# Patient Record
Sex: FEMALE | Race: ASIAN | NOT HISPANIC OR LATINO | URBAN - METROPOLITAN AREA
[De-identification: names, ages, dates, MRNs, and addresses within clinical notes are randomized per-mention and may not be internally consistent; named-entity substitution may affect disease eponyms.]

---

## 2021-02-15 ENCOUNTER — INPATIENT (INPATIENT)
Facility: HOSPITAL | Age: 31
LOS: 4 days | Discharge: HOME | End: 2021-02-20
Attending: OBSTETRICS & GYNECOLOGY | Admitting: OBSTETRICS & GYNECOLOGY
Payer: COMMERCIAL

## 2021-02-15 ENCOUNTER — EMERGENCY (EMERGENCY)
Facility: HOSPITAL | Age: 31
LOS: 1 days | Discharge: STILL ADMITTED OR ADMINISTRATIVE | End: 2021-02-15
Admitting: EMERGENCY MEDICINE
Payer: SELF-PAY

## 2021-02-15 VITALS
DIASTOLIC BLOOD PRESSURE: 96 MMHG | TEMPERATURE: 98 F | RESPIRATION RATE: 16 BRPM | SYSTOLIC BLOOD PRESSURE: 152 MMHG | HEART RATE: 100 BPM | OXYGEN SATURATION: 100 %

## 2021-02-15 VITALS
HEART RATE: 107 BPM | RESPIRATION RATE: 17 BRPM | DIASTOLIC BLOOD PRESSURE: 107 MMHG | TEMPERATURE: 98 F | SYSTOLIC BLOOD PRESSURE: 152 MMHG

## 2021-02-15 DIAGNOSIS — O26.899 OTHER SPECIFIED PREGNANCY RELATED CONDITIONS, UNSPECIFIED TRIMESTER: ICD-10-CM

## 2021-02-15 DIAGNOSIS — Z3A.00 WEEKS OF GESTATION OF PREGNANCY NOT SPECIFIED: ICD-10-CM

## 2021-02-15 LAB
ALBUMIN SERPL ELPH-MCNC: 3.8 G/DL — SIGNIFICANT CHANGE UP (ref 3.3–5)
ALP SERPL-CCNC: 230 U/L — HIGH (ref 40–120)
ALT FLD-CCNC: 18 U/L — SIGNIFICANT CHANGE UP (ref 4–33)
ANION GAP SERPL CALC-SCNC: 16 MMOL/L — HIGH (ref 7–14)
APPEARANCE UR: ABNORMAL
APTT BLD: 30.7 SEC — SIGNIFICANT CHANGE UP (ref 27–36.3)
AST SERPL-CCNC: 31 U/L — SIGNIFICANT CHANGE UP (ref 4–32)
BACTERIA # UR AUTO: ABNORMAL
BASOPHILS # BLD AUTO: 0.04 K/UL — SIGNIFICANT CHANGE UP (ref 0–0.2)
BASOPHILS NFR BLD AUTO: 0.5 % — SIGNIFICANT CHANGE UP (ref 0–2)
BILIRUB SERPL-MCNC: 0.4 MG/DL — SIGNIFICANT CHANGE UP (ref 0.2–1.2)
BILIRUB UR-MCNC: NEGATIVE — SIGNIFICANT CHANGE UP
BLD GP AB SCN SERPL QL: NEGATIVE — SIGNIFICANT CHANGE UP
BUN SERPL-MCNC: 8 MG/DL — SIGNIFICANT CHANGE UP (ref 7–23)
CALCIUM SERPL-MCNC: 10 MG/DL — SIGNIFICANT CHANGE UP (ref 8.4–10.5)
CHLORIDE SERPL-SCNC: 101 MMOL/L — SIGNIFICANT CHANGE UP (ref 98–107)
CO2 SERPL-SCNC: 20 MMOL/L — LOW (ref 22–31)
COLOR SPEC: YELLOW — SIGNIFICANT CHANGE UP
CREAT ?TM UR-MCNC: 82 MG/DL — SIGNIFICANT CHANGE UP
CREAT SERPL-MCNC: 0.6 MG/DL — SIGNIFICANT CHANGE UP (ref 0.5–1.3)
DIFF PNL FLD: ABNORMAL
EOSINOPHIL # BLD AUTO: 0.05 K/UL — SIGNIFICANT CHANGE UP (ref 0–0.5)
EOSINOPHIL NFR BLD AUTO: 0.6 % — SIGNIFICANT CHANGE UP (ref 0–6)
EPI CELLS # UR: 2 /HPF — SIGNIFICANT CHANGE UP (ref 0–5)
FIBRINOGEN PPP-MCNC: 551 MG/DL — HIGH (ref 290–520)
GLUCOSE SERPL-MCNC: 108 MG/DL — HIGH (ref 70–99)
GLUCOSE UR QL: NEGATIVE — SIGNIFICANT CHANGE UP
HCT VFR BLD CALC: 42 % — SIGNIFICANT CHANGE UP (ref 34.5–45)
HGB BLD-MCNC: 13.9 G/DL — SIGNIFICANT CHANGE UP (ref 11.5–15.5)
HYALINE CASTS # UR AUTO: 3 /LPF — SIGNIFICANT CHANGE UP (ref 0–7)
IANC: 6.48 K/UL — SIGNIFICANT CHANGE UP (ref 1.5–8.5)
IMM GRANULOCYTES NFR BLD AUTO: 0.9 % — SIGNIFICANT CHANGE UP (ref 0–1.5)
INR BLD: 0.96 RATIO — SIGNIFICANT CHANGE UP (ref 0.88–1.16)
KETONES UR-MCNC: ABNORMAL
LDH SERPL L TO P-CCNC: 213 U/L — SIGNIFICANT CHANGE UP (ref 135–225)
LEUKOCYTE ESTERASE UR-ACNC: ABNORMAL
LYMPHOCYTES # BLD AUTO: 1.25 K/UL — SIGNIFICANT CHANGE UP (ref 1–3.3)
LYMPHOCYTES # BLD AUTO: 14.7 % — SIGNIFICANT CHANGE UP (ref 13–44)
MCHC RBC-ENTMCNC: 30.5 PG — SIGNIFICANT CHANGE UP (ref 27–34)
MCHC RBC-ENTMCNC: 33.1 GM/DL — SIGNIFICANT CHANGE UP (ref 32–36)
MCV RBC AUTO: 92.3 FL — SIGNIFICANT CHANGE UP (ref 80–100)
MONOCYTES # BLD AUTO: 0.58 K/UL — SIGNIFICANT CHANGE UP (ref 0–0.9)
MONOCYTES NFR BLD AUTO: 6.8 % — SIGNIFICANT CHANGE UP (ref 2–14)
NEUTROPHILS # BLD AUTO: 6.48 K/UL — SIGNIFICANT CHANGE UP (ref 1.8–7.4)
NEUTROPHILS NFR BLD AUTO: 76.5 % — SIGNIFICANT CHANGE UP (ref 43–77)
NITRITE UR-MCNC: NEGATIVE — SIGNIFICANT CHANGE UP
NRBC # BLD: 0 /100 WBCS — SIGNIFICANT CHANGE UP
NRBC # FLD: 0.02 K/UL — HIGH
PH UR: 6 — SIGNIFICANT CHANGE UP (ref 5–8)
PLATELET # BLD AUTO: 138 K/UL — LOW (ref 150–400)
POTASSIUM SERPL-MCNC: 3.4 MMOL/L — LOW (ref 3.5–5.3)
POTASSIUM SERPL-SCNC: 3.4 MMOL/L — LOW (ref 3.5–5.3)
PROT ?TM UR-MCNC: 32 MG/DL — SIGNIFICANT CHANGE UP
PROT ?TM UR-MCNC: 32 MG/DL — SIGNIFICANT CHANGE UP
PROT SERPL-MCNC: 6.8 G/DL — SIGNIFICANT CHANGE UP (ref 6–8.3)
PROT UR-MCNC: ABNORMAL
PROT/CREAT UR-RTO: 0.4 RATIO — HIGH (ref 0–0.2)
PROTHROM AB SERPL-ACNC: 11 SEC — SIGNIFICANT CHANGE UP (ref 10.6–13.6)
RBC # BLD: 4.55 M/UL — SIGNIFICANT CHANGE UP (ref 3.8–5.2)
RBC # FLD: 14.1 % — SIGNIFICANT CHANGE UP (ref 10.3–14.5)
RBC CASTS # UR COMP ASSIST: 2 /HPF — SIGNIFICANT CHANGE UP (ref 0–4)
RH IG SCN BLD-IMP: POSITIVE — SIGNIFICANT CHANGE UP
SODIUM SERPL-SCNC: 137 MMOL/L — SIGNIFICANT CHANGE UP (ref 135–145)
SP GR SPEC: 1.01 — SIGNIFICANT CHANGE UP (ref 1.01–1.02)
URATE SERPL-MCNC: 6.1 MG/DL — SIGNIFICANT CHANGE UP (ref 2.5–7)
UROBILINOGEN FLD QL: SIGNIFICANT CHANGE UP
WBC # BLD: 8.48 K/UL — SIGNIFICANT CHANGE UP (ref 3.8–10.5)
WBC # FLD AUTO: 8.48 K/UL — SIGNIFICANT CHANGE UP (ref 3.8–10.5)
WBC UR QL: 31 /HPF — HIGH (ref 0–5)

## 2021-02-15 PROCEDURE — L9993: CPT

## 2021-02-15 RX ORDER — SODIUM CHLORIDE 9 MG/ML
1000 INJECTION, SOLUTION INTRAVENOUS
Refills: 0 | Status: DISCONTINUED | OUTPATIENT
Start: 2021-02-15 | End: 2021-02-17

## 2021-02-15 RX ORDER — OXYTOCIN 10 UNIT/ML
333.33 VIAL (ML) INJECTION
Qty: 20 | Refills: 0 | Status: DISCONTINUED | OUTPATIENT
Start: 2021-02-15 | End: 2021-02-17

## 2021-02-15 RX ORDER — MAGNESIUM SULFATE 500 MG/ML
4 VIAL (ML) INJECTION ONCE
Refills: 0 | Status: COMPLETED | OUTPATIENT
Start: 2021-02-15 | End: 2021-02-15

## 2021-02-15 RX ORDER — CITRIC ACID/SODIUM CITRATE 300-500 MG
15 SOLUTION, ORAL ORAL EVERY 6 HOURS
Refills: 0 | Status: DISCONTINUED | OUTPATIENT
Start: 2021-02-15 | End: 2021-02-17

## 2021-02-15 RX ORDER — ACETAMINOPHEN 500 MG
975 TABLET ORAL ONCE
Refills: 0 | Status: COMPLETED | OUTPATIENT
Start: 2021-02-15 | End: 2021-02-15

## 2021-02-15 RX ORDER — MAGNESIUM SULFATE 500 MG/ML
2 VIAL (ML) INJECTION
Qty: 40 | Refills: 0 | Status: COMPLETED | OUTPATIENT
Start: 2021-02-15 | End: 2021-02-16

## 2021-02-15 RX ADMIN — Medication 975 MILLIGRAM(S): at 20:50

## 2021-02-15 RX ADMIN — Medication 200 GRAM(S): at 21:36

## 2021-02-15 RX ADMIN — Medication 50 GM/HR: at 22:12

## 2021-02-15 RX ADMIN — SODIUM CHLORIDE 50 MILLILITER(S): 9 INJECTION, SOLUTION INTRAVENOUS at 21:37

## 2021-02-15 NOTE — OB PROVIDER H&P - ATTENDING COMMENTS
Attending Note   Admit for pre-eclampsia with severe features given headache and mild range bps   will plan for po cytotec and cervical balloon

## 2021-02-15 NOTE — ED ADULT TRIAGE NOTE - CHIEF COMPLAINT QUOTE
Pt sent for eval of high BP, pt states that she has been following with OB for the last week, pt c/o slight headache, pt  38 weeks pregnant EDC 21

## 2021-02-15 NOTE — OB PROVIDER H&P - ASSESSMENT
Assessment  – 32yo  at 38w5d with persistent mild range pressures and a HA x1 day, meets criteria for sPEC.    Plan  Admit to LND. Routine Labs. IVF.  IOL w/ PO cytotec, cervical balloon  Fetus: cat 1 tracing. Continuous EFM.   sPEC - Mg bolus followed by maintenance, continue to monitor pressures. Mg q6hrs. HELLP labs pending   GBS neg  COVID pending     Patient discussed with attending physician, Dr. Stern.      Minal Ponce MD PGY1

## 2021-02-15 NOTE — OB PROVIDER H&P - HISTORY OF PRESENT ILLNESS
R1 Admission H&P    Subjective  HPI: 31yoF  at 38w4d gestational age sent in from office for elevated blood pressure. Per patient, she was told her blood pressure was elevated in the office one week ago, >140s/80s. Patient was seen in the office today where BP was again elevated, 148/96. She was sent in from the office and initially presented to the ED where her BP was 152/96. Patient endorsing headache since last night, states it feels like a "pressure" in her head and behind her eyes that has gotten gradually worse. Denies blurry vision, RUQ pain. +FM. -LOF. -CTXs. Endorsing spotting after having membranes stripped in office today. Pt denies any other concerns.    – PNC: Gestational hypertension, first noted at 37w GA. Recurrent UTI, most recently 1 month ago treated with Keflex. GBS neg.  EFW 3500g by giovani.  – OBHx: G1  – GynHx: denies  – PMH: denies  – PSH: denies  – Psych: denies   – Social: denies   – Meds: PNV   – Allergies: NKDA  – Will accept blood transfusions? Yes

## 2021-02-15 NOTE — OB PROVIDER H&P - NSHPPHYSICALEXAM_GEN_ALL_CORE
Objective  Vital signs  VS  T(C): 36.9 (02-15-21 @ 21:40)  HR: 111 (02-15-21 @ 22:10)  BP: 129/78 (02-15-21 @ 22:10)  RR: 17 (02-15-21 @ 21:40)  SpO2: 100% (02-15-21 @ 19:45)    – PE:   CV: RRR  Pulm: breathing comfortably on RA  Abd: gravid, nontender  Extr: moving all extremities with ease  – VE: 1/50/-3  – FHT: baseline 125, mod variability, -accels, -decels  – [place]: irregular  – EFW: 3500g by sono  – Sono: vertex

## 2021-02-15 NOTE — OB PROVIDER LABOR PROGRESS NOTE - NS_SUBJECTIVE/OBJECTIVE_OBGYN_ALL_OB_FT
R1 Progress Note     Patient examined for placement of cervical balloon. CB placed 60 cc NS in uterine, 60 in vaginal. Patient tolerated procedure well.

## 2021-02-15 NOTE — OB RN TRIAGE NOTE - CHIEF COMPLAINT QUOTE
My BP was high @ the 's office 148/96.  I had a h/a last night relieved with tylenol & again today which went away on its own.  I also have pressure in my eyes.

## 2021-02-16 DIAGNOSIS — O16.9 UNSPECIFIED MATERNAL HYPERTENSION, UNSPECIFIED TRIMESTER: ICD-10-CM

## 2021-02-16 LAB
ALBUMIN SERPL ELPH-MCNC: 3.4 G/DL — SIGNIFICANT CHANGE UP (ref 3.3–5)
ALBUMIN SERPL ELPH-MCNC: 3.4 G/DL — SIGNIFICANT CHANGE UP (ref 3.3–5)
ALBUMIN SERPL ELPH-MCNC: 3.6 G/DL — SIGNIFICANT CHANGE UP (ref 3.3–5)
ALBUMIN SERPL ELPH-MCNC: 3.6 G/DL — SIGNIFICANT CHANGE UP (ref 3.3–5)
ALP SERPL-CCNC: 208 U/L — HIGH (ref 40–120)
ALP SERPL-CCNC: 222 U/L — HIGH (ref 40–120)
ALP SERPL-CCNC: 225 U/L — HIGH (ref 40–120)
ALP SERPL-CCNC: 235 U/L — HIGH (ref 40–120)
ALT FLD-CCNC: 11 U/L — SIGNIFICANT CHANGE UP (ref 4–33)
ALT FLD-CCNC: 14 U/L — SIGNIFICANT CHANGE UP (ref 4–33)
ALT FLD-CCNC: 14 U/L — SIGNIFICANT CHANGE UP (ref 4–33)
ALT FLD-CCNC: 15 U/L — SIGNIFICANT CHANGE UP (ref 4–33)
ANION GAP SERPL CALC-SCNC: 11 MMOL/L — SIGNIFICANT CHANGE UP (ref 7–14)
ANION GAP SERPL CALC-SCNC: 12 MMOL/L — SIGNIFICANT CHANGE UP (ref 7–14)
ANION GAP SERPL CALC-SCNC: 14 MMOL/L — SIGNIFICANT CHANGE UP (ref 7–14)
ANION GAP SERPL CALC-SCNC: 15 MMOL/L — HIGH (ref 7–14)
APTT BLD: 27.7 SEC — SIGNIFICANT CHANGE UP (ref 27–36.3)
APTT BLD: 28 SEC — SIGNIFICANT CHANGE UP (ref 27–36.3)
APTT BLD: 28.3 SEC — SIGNIFICANT CHANGE UP (ref 27–36.3)
APTT BLD: 28.7 SEC — SIGNIFICANT CHANGE UP (ref 27–36.3)
AST SERPL-CCNC: 24 U/L — SIGNIFICANT CHANGE UP (ref 4–32)
AST SERPL-CCNC: 24 U/L — SIGNIFICANT CHANGE UP (ref 4–32)
AST SERPL-CCNC: 25 U/L — SIGNIFICANT CHANGE UP (ref 4–32)
AST SERPL-CCNC: 25 U/L — SIGNIFICANT CHANGE UP (ref 4–32)
BASE EXCESS BLDA CALC-SCNC: -4 MMOL/L — LOW (ref -2–2)
BASOPHILS # BLD AUTO: 0 K/UL — SIGNIFICANT CHANGE UP (ref 0–0.2)
BASOPHILS # BLD AUTO: 0.04 K/UL — SIGNIFICANT CHANGE UP (ref 0–0.2)
BASOPHILS NFR BLD AUTO: 0 % — SIGNIFICANT CHANGE UP (ref 0–2)
BASOPHILS NFR BLD AUTO: 0.4 % — SIGNIFICANT CHANGE UP (ref 0–2)
BASOPHILS NFR BLD AUTO: 0.5 % — SIGNIFICANT CHANGE UP (ref 0–2)
BASOPHILS NFR BLD AUTO: 0.5 % — SIGNIFICANT CHANGE UP (ref 0–2)
BILIRUB SERPL-MCNC: 0.3 MG/DL — SIGNIFICANT CHANGE UP (ref 0.2–1.2)
BILIRUB SERPL-MCNC: 0.4 MG/DL — SIGNIFICANT CHANGE UP (ref 0.2–1.2)
BILIRUB SERPL-MCNC: 0.4 MG/DL — SIGNIFICANT CHANGE UP (ref 0.2–1.2)
BILIRUB SERPL-MCNC: 0.5 MG/DL — SIGNIFICANT CHANGE UP (ref 0.2–1.2)
BUN SERPL-MCNC: 5 MG/DL — LOW (ref 7–23)
BUN SERPL-MCNC: 5 MG/DL — LOW (ref 7–23)
BUN SERPL-MCNC: 6 MG/DL — LOW (ref 7–23)
BUN SERPL-MCNC: 8 MG/DL — SIGNIFICANT CHANGE UP (ref 7–23)
CALCIUM SERPL-MCNC: 7.6 MG/DL — LOW (ref 8.4–10.5)
CALCIUM SERPL-MCNC: 7.8 MG/DL — LOW (ref 8.4–10.5)
CALCIUM SERPL-MCNC: 8 MG/DL — LOW (ref 8.4–10.5)
CALCIUM SERPL-MCNC: 9.2 MG/DL — SIGNIFICANT CHANGE UP (ref 8.4–10.5)
CHLORIDE SERPL-SCNC: 100 MMOL/L — SIGNIFICANT CHANGE UP (ref 98–107)
CHLORIDE SERPL-SCNC: 101 MMOL/L — SIGNIFICANT CHANGE UP (ref 98–107)
CHLORIDE SERPL-SCNC: 98 MMOL/L — SIGNIFICANT CHANGE UP (ref 98–107)
CHLORIDE SERPL-SCNC: 99 MMOL/L — SIGNIFICANT CHANGE UP (ref 98–107)
CO2 SERPL-SCNC: 17 MMOL/L — LOW (ref 22–31)
CO2 SERPL-SCNC: 18 MMOL/L — LOW (ref 22–31)
CO2 SERPL-SCNC: 21 MMOL/L — LOW (ref 22–31)
CO2 SERPL-SCNC: 21 MMOL/L — LOW (ref 22–31)
CREAT SERPL-MCNC: 0.53 MG/DL — SIGNIFICANT CHANGE UP (ref 0.5–1.3)
CREAT SERPL-MCNC: 0.56 MG/DL — SIGNIFICANT CHANGE UP (ref 0.5–1.3)
CREAT SERPL-MCNC: 0.58 MG/DL — SIGNIFICANT CHANGE UP (ref 0.5–1.3)
CREAT SERPL-MCNC: 0.74 MG/DL — SIGNIFICANT CHANGE UP (ref 0.5–1.3)
EOSINOPHIL # BLD AUTO: 0 K/UL — SIGNIFICANT CHANGE UP (ref 0–0.5)
EOSINOPHIL # BLD AUTO: 0.04 K/UL — SIGNIFICANT CHANGE UP (ref 0–0.5)
EOSINOPHIL # BLD AUTO: 0.05 K/UL — SIGNIFICANT CHANGE UP (ref 0–0.5)
EOSINOPHIL # BLD AUTO: 0.05 K/UL — SIGNIFICANT CHANGE UP (ref 0–0.5)
EOSINOPHIL NFR BLD AUTO: 0 % — SIGNIFICANT CHANGE UP (ref 0–6)
EOSINOPHIL NFR BLD AUTO: 0.5 % — SIGNIFICANT CHANGE UP (ref 0–6)
EOSINOPHIL NFR BLD AUTO: 0.5 % — SIGNIFICANT CHANGE UP (ref 0–6)
EOSINOPHIL NFR BLD AUTO: 0.6 % — SIGNIFICANT CHANGE UP (ref 0–6)
FIBRINOGEN PPP-MCNC: 523 MG/DL — HIGH (ref 290–520)
FIBRINOGEN PPP-MCNC: 533 MG/DL — HIGH (ref 290–520)
FIBRINOGEN PPP-MCNC: 575 MG/DL — HIGH (ref 290–520)
FIBRINOGEN PPP-MCNC: 598 MG/DL — HIGH (ref 290–520)
GLUCOSE SERPL-MCNC: 103 MG/DL — HIGH (ref 70–99)
GLUCOSE SERPL-MCNC: 87 MG/DL — SIGNIFICANT CHANGE UP (ref 70–99)
GLUCOSE SERPL-MCNC: 89 MG/DL — SIGNIFICANT CHANGE UP (ref 70–99)
GLUCOSE SERPL-MCNC: 90 MG/DL — SIGNIFICANT CHANGE UP (ref 70–99)
HBV SURFACE AG SERPL QL IA: SIGNIFICANT CHANGE UP
HCO3 BLDA-SCNC: 22 MMOL/L — SIGNIFICANT CHANGE UP (ref 22–26)
HCT VFR BLD CALC: 37.6 % — SIGNIFICANT CHANGE UP (ref 34.5–45)
HCT VFR BLD CALC: 38.8 % — SIGNIFICANT CHANGE UP (ref 34.5–45)
HCT VFR BLD CALC: 39.8 % — SIGNIFICANT CHANGE UP (ref 34.5–45)
HCT VFR BLD CALC: 40.4 % — SIGNIFICANT CHANGE UP (ref 34.5–45)
HGB BLD-MCNC: 12.7 G/DL — SIGNIFICANT CHANGE UP (ref 11.5–15.5)
HGB BLD-MCNC: 12.9 G/DL — SIGNIFICANT CHANGE UP (ref 11.5–15.5)
HGB BLD-MCNC: 13 G/DL — SIGNIFICANT CHANGE UP (ref 11.5–15.5)
HGB BLD-MCNC: 13.4 G/DL — SIGNIFICANT CHANGE UP (ref 11.5–15.5)
HIV 1+2 AB+HIV1 P24 AG SERPL QL IA: SIGNIFICANT CHANGE UP
IANC: 5.81 K/UL — SIGNIFICANT CHANGE UP (ref 1.5–8.5)
IANC: 6.7 K/UL — SIGNIFICANT CHANGE UP (ref 1.5–8.5)
IANC: 6.75 K/UL — SIGNIFICANT CHANGE UP (ref 1.5–8.5)
IANC: 8.62 K/UL — HIGH (ref 1.5–8.5)
IMM GRANULOCYTES NFR BLD AUTO: 0.8 % — SIGNIFICANT CHANGE UP (ref 0–1.5)
IMM GRANULOCYTES NFR BLD AUTO: 0.8 % — SIGNIFICANT CHANGE UP (ref 0–1.5)
IMM GRANULOCYTES NFR BLD AUTO: 0.9 % — SIGNIFICANT CHANGE UP (ref 0–1.5)
INR BLD: 0.93 RATIO — SIGNIFICANT CHANGE UP (ref 0.88–1.16)
INR BLD: 0.94 RATIO — SIGNIFICANT CHANGE UP (ref 0.88–1.16)
LDH SERPL L TO P-CCNC: 181 U/L — SIGNIFICANT CHANGE UP (ref 135–225)
LDH SERPL L TO P-CCNC: 201 U/L — SIGNIFICANT CHANGE UP (ref 135–225)
LDH SERPL L TO P-CCNC: 217 U/L — SIGNIFICANT CHANGE UP (ref 135–225)
LDH SERPL L TO P-CCNC: 242 U/L — HIGH (ref 135–225)
LYMPHOCYTES # BLD AUTO: 0.68 K/UL — LOW (ref 1–3.3)
LYMPHOCYTES # BLD AUTO: 0.79 K/UL — LOW (ref 1–3.3)
LYMPHOCYTES # BLD AUTO: 0.96 K/UL — LOW (ref 1–3.3)
LYMPHOCYTES # BLD AUTO: 1.09 K/UL — SIGNIFICANT CHANGE UP (ref 1–3.3)
LYMPHOCYTES # BLD AUTO: 11.3 % — LOW (ref 13–44)
LYMPHOCYTES # BLD AUTO: 12.7 % — LOW (ref 13–44)
LYMPHOCYTES # BLD AUTO: 7.8 % — LOW (ref 13–44)
LYMPHOCYTES # BLD AUTO: 8.9 % — LOW (ref 13–44)
MAGNESIUM SERPL-MCNC: 4.8 MG/DL — HIGH (ref 1.6–2.6)
MAGNESIUM SERPL-MCNC: 5.3 MG/DL — HIGH (ref 1.6–2.6)
MAGNESIUM SERPL-MCNC: 6 MG/DL — HIGH (ref 1.6–2.6)
MAGNESIUM SERPL-MCNC: 6.1 MG/DL — HIGH (ref 1.6–2.6)
MCHC RBC-ENTMCNC: 30.5 PG — SIGNIFICANT CHANGE UP (ref 27–34)
MCHC RBC-ENTMCNC: 30.7 PG — SIGNIFICANT CHANGE UP (ref 27–34)
MCHC RBC-ENTMCNC: 32.7 GM/DL — SIGNIFICANT CHANGE UP (ref 32–36)
MCHC RBC-ENTMCNC: 33.2 GM/DL — SIGNIFICANT CHANGE UP (ref 32–36)
MCHC RBC-ENTMCNC: 33.2 GM/DL — SIGNIFICANT CHANGE UP (ref 32–36)
MCHC RBC-ENTMCNC: 33.8 GM/DL — SIGNIFICANT CHANGE UP (ref 32–36)
MCV RBC AUTO: 90.8 FL — SIGNIFICANT CHANGE UP (ref 80–100)
MCV RBC AUTO: 92.4 FL — SIGNIFICANT CHANGE UP (ref 80–100)
MCV RBC AUTO: 92.7 FL — SIGNIFICANT CHANGE UP (ref 80–100)
MCV RBC AUTO: 93.4 FL — SIGNIFICANT CHANGE UP (ref 80–100)
MONOCYTES # BLD AUTO: 0.14 K/UL — SIGNIFICANT CHANGE UP (ref 0–0.9)
MONOCYTES # BLD AUTO: 0.57 K/UL — SIGNIFICANT CHANGE UP (ref 0–0.9)
MONOCYTES # BLD AUTO: 0.62 K/UL — SIGNIFICANT CHANGE UP (ref 0–0.9)
MONOCYTES # BLD AUTO: 0.63 K/UL — SIGNIFICANT CHANGE UP (ref 0–0.9)
MONOCYTES NFR BLD AUTO: 1.8 % — LOW (ref 2–14)
MONOCYTES NFR BLD AUTO: 5.6 % — SIGNIFICANT CHANGE UP (ref 2–14)
MONOCYTES NFR BLD AUTO: 7.3 % — SIGNIFICANT CHANGE UP (ref 2–14)
MONOCYTES NFR BLD AUTO: 7.4 % — SIGNIFICANT CHANGE UP (ref 2–14)
NEUTROPHILS # BLD AUTO: 6.44 K/UL — SIGNIFICANT CHANGE UP (ref 1.8–7.4)
NEUTROPHILS # BLD AUTO: 6.7 K/UL — SIGNIFICANT CHANGE UP (ref 1.8–7.4)
NEUTROPHILS # BLD AUTO: 6.75 K/UL — SIGNIFICANT CHANGE UP (ref 1.8–7.4)
NEUTROPHILS # BLD AUTO: 8.62 K/UL — HIGH (ref 1.8–7.4)
NEUTROPHILS NFR BLD AUTO: 78.1 % — HIGH (ref 43–77)
NEUTROPHILS NFR BLD AUTO: 79.4 % — HIGH (ref 43–77)
NEUTROPHILS NFR BLD AUTO: 84.8 % — HIGH (ref 43–77)
NEUTROPHILS NFR BLD AUTO: 84.9 % — HIGH (ref 43–77)
NRBC # BLD: 0 /100 WBCS — SIGNIFICANT CHANGE UP
NRBC # FLD: 0 K/UL — SIGNIFICANT CHANGE UP
PCO2 BLDA: 30 MMHG — LOW (ref 32–48)
PH BLDA: 7.43 — SIGNIFICANT CHANGE UP (ref 7.35–7.45)
PLATELET # BLD AUTO: 126 K/UL — LOW (ref 150–400)
PLATELET # BLD AUTO: 127 K/UL — LOW (ref 150–400)
PLATELET # BLD AUTO: 135 K/UL — LOW (ref 150–400)
PLATELET # BLD AUTO: 138 K/UL — LOW (ref 150–400)
PO2 BLDA: 89 MMHG — SIGNIFICANT CHANGE UP (ref 83–108)
POTASSIUM SERPL-MCNC: 3.6 MMOL/L — SIGNIFICANT CHANGE UP (ref 3.5–5.3)
POTASSIUM SERPL-MCNC: 3.7 MMOL/L — SIGNIFICANT CHANGE UP (ref 3.5–5.3)
POTASSIUM SERPL-MCNC: 3.7 MMOL/L — SIGNIFICANT CHANGE UP (ref 3.5–5.3)
POTASSIUM SERPL-MCNC: 3.8 MMOL/L — SIGNIFICANT CHANGE UP (ref 3.5–5.3)
POTASSIUM SERPL-SCNC: 3.6 MMOL/L — SIGNIFICANT CHANGE UP (ref 3.5–5.3)
POTASSIUM SERPL-SCNC: 3.7 MMOL/L — SIGNIFICANT CHANGE UP (ref 3.5–5.3)
POTASSIUM SERPL-SCNC: 3.7 MMOL/L — SIGNIFICANT CHANGE UP (ref 3.5–5.3)
POTASSIUM SERPL-SCNC: 3.8 MMOL/L — SIGNIFICANT CHANGE UP (ref 3.5–5.3)
PROT SERPL-MCNC: 6.1 G/DL — SIGNIFICANT CHANGE UP (ref 6–8.3)
PROT SERPL-MCNC: 6.4 G/DL — SIGNIFICANT CHANGE UP (ref 6–8.3)
PROT SERPL-MCNC: 6.6 G/DL — SIGNIFICANT CHANGE UP (ref 6–8.3)
PROT SERPL-MCNC: 6.8 G/DL — SIGNIFICANT CHANGE UP (ref 6–8.3)
PROTHROM AB SERPL-ACNC: 10.6 SEC — SIGNIFICANT CHANGE UP (ref 10.6–13.6)
PROTHROM AB SERPL-ACNC: 10.7 SEC — SIGNIFICANT CHANGE UP (ref 10.6–13.6)
PROTHROM AB SERPL-ACNC: 10.8 SEC — SIGNIFICANT CHANGE UP (ref 10.6–13.6)
PROTHROM AB SERPL-ACNC: 10.8 SEC — SIGNIFICANT CHANGE UP (ref 10.6–13.6)
RBC # BLD: 4.14 M/UL — SIGNIFICANT CHANGE UP (ref 3.8–5.2)
RBC # BLD: 4.2 M/UL — SIGNIFICANT CHANGE UP (ref 3.8–5.2)
RBC # BLD: 4.26 M/UL — SIGNIFICANT CHANGE UP (ref 3.8–5.2)
RBC # BLD: 4.36 M/UL — SIGNIFICANT CHANGE UP (ref 3.8–5.2)
RBC # FLD: 14 % — SIGNIFICANT CHANGE UP (ref 10.3–14.5)
RBC # FLD: 14.2 % — SIGNIFICANT CHANGE UP (ref 10.3–14.5)
RBC # FLD: 14.2 % — SIGNIFICANT CHANGE UP (ref 10.3–14.5)
RBC # FLD: 14.3 % — SIGNIFICANT CHANGE UP (ref 10.3–14.5)
RUBV IGG SER-ACNC: 3.2 INDEX — SIGNIFICANT CHANGE UP
RUBV IGG SER-IMP: POSITIVE — SIGNIFICANT CHANGE UP
SAO2 % BLDA: 96.9 % — SIGNIFICANT CHANGE UP (ref 95–99)
SARS-COV-2 IGG SERPL QL IA: NEGATIVE — SIGNIFICANT CHANGE UP
SARS-COV-2 IGM SERPL IA-ACNC: 0.08 INDEX — SIGNIFICANT CHANGE UP
SARS-COV-2 RNA SPEC QL NAA+PROBE: SIGNIFICANT CHANGE UP
SODIUM SERPL-SCNC: 131 MMOL/L — LOW (ref 135–145)
SODIUM SERPL-SCNC: 131 MMOL/L — LOW (ref 135–145)
SODIUM SERPL-SCNC: 132 MMOL/L — LOW (ref 135–145)
SODIUM SERPL-SCNC: 133 MMOL/L — LOW (ref 135–145)
T PALLIDUM AB TITR SER: NEGATIVE — SIGNIFICANT CHANGE UP
URATE SERPL-MCNC: 6 MG/DL — SIGNIFICANT CHANGE UP (ref 2.5–7)
URATE SERPL-MCNC: 6.1 MG/DL — SIGNIFICANT CHANGE UP (ref 2.5–7)
URATE SERPL-MCNC: 6.4 MG/DL — SIGNIFICANT CHANGE UP (ref 2.5–7)
URATE SERPL-MCNC: 6.6 MG/DL — SIGNIFICANT CHANGE UP (ref 2.5–7)
WBC # BLD: 10.15 K/UL — SIGNIFICANT CHANGE UP (ref 3.8–10.5)
WBC # BLD: 7.59 K/UL — SIGNIFICANT CHANGE UP (ref 3.8–10.5)
WBC # BLD: 8.5 K/UL — SIGNIFICANT CHANGE UP (ref 3.8–10.5)
WBC # BLD: 8.57 K/UL — SIGNIFICANT CHANGE UP (ref 3.8–10.5)
WBC # FLD AUTO: 10.15 K/UL — SIGNIFICANT CHANGE UP (ref 3.8–10.5)
WBC # FLD AUTO: 7.59 K/UL — SIGNIFICANT CHANGE UP (ref 3.8–10.5)
WBC # FLD AUTO: 8.5 K/UL — SIGNIFICANT CHANGE UP (ref 3.8–10.5)
WBC # FLD AUTO: 8.57 K/UL — SIGNIFICANT CHANGE UP (ref 3.8–10.5)

## 2021-02-16 PROCEDURE — 71045 X-RAY EXAM CHEST 1 VIEW: CPT | Mod: 26

## 2021-02-16 RX ORDER — FAMOTIDINE 10 MG/ML
20 INJECTION INTRAVENOUS ONCE
Refills: 0 | Status: COMPLETED | OUTPATIENT
Start: 2021-02-16 | End: 2021-02-16

## 2021-02-16 RX ORDER — ONDANSETRON 8 MG/1
4 TABLET, FILM COATED ORAL ONCE
Refills: 0 | Status: DISCONTINUED | OUTPATIENT
Start: 2021-02-16 | End: 2021-02-20

## 2021-02-16 RX ORDER — OXYTOCIN 10 UNIT/ML
2 VIAL (ML) INJECTION
Qty: 30 | Refills: 0 | Status: DISCONTINUED | OUTPATIENT
Start: 2021-02-16 | End: 2021-02-17

## 2021-02-16 RX ORDER — SODIUM CHLORIDE 0.65 %
1 AEROSOL, SPRAY (ML) NASAL EVERY 4 HOURS
Refills: 0 | Status: DISCONTINUED | OUTPATIENT
Start: 2021-02-16 | End: 2021-02-20

## 2021-02-16 RX ORDER — ACETAMINOPHEN 500 MG
975 TABLET ORAL ONCE
Refills: 0 | Status: COMPLETED | OUTPATIENT
Start: 2021-02-16 | End: 2021-02-16

## 2021-02-16 RX ADMIN — SODIUM CHLORIDE 50 MILLILITER(S): 9 INJECTION, SOLUTION INTRAVENOUS at 19:13

## 2021-02-16 RX ADMIN — Medication 50 GM/HR: at 19:17

## 2021-02-16 RX ADMIN — Medication 50 GM/HR: at 19:13

## 2021-02-16 RX ADMIN — SODIUM CHLORIDE 50 MILLILITER(S): 9 INJECTION, SOLUTION INTRAVENOUS at 19:17

## 2021-02-16 RX ADMIN — FAMOTIDINE 20 MILLIGRAM(S): 10 INJECTION INTRAVENOUS at 05:10

## 2021-02-16 RX ADMIN — Medication 975 MILLIGRAM(S): at 08:49

## 2021-02-16 RX ADMIN — SODIUM CHLORIDE 50 MILLILITER(S): 9 INJECTION, SOLUTION INTRAVENOUS at 07:15

## 2021-02-16 RX ADMIN — Medication 50 GM/HR: at 07:12

## 2021-02-16 NOTE — CHART NOTE - NSCHARTNOTEFT_GEN_A_CORE
Patient evaluated bedside. Patient with O2 desaturation overnight, on supplemental O2 for desaturation overnight to low 90s. Pulse oximeter relocated to patient ear and supplemental O2 discontinued. Patient now saturating 96-98% on RA while bedside. Patient denies any SOB, CP, N/V. She does report some mild lightheadedness on the Mg and return of her headache, but denies any visual disturbances.     VS  T(C): 36.6 (21 @ 05:40)  HR: 101 (21 @ 08:41)  BP: 125/81 (21 @ 08:41)  RR: 17 (02-15-21 @ 21:40)  SpO2: 98% (21 @ 08:40)    Physical Exam:  General: NAD  CV: S1/S2+, RRR  Lungs: CTA Franc  Abdomen: Soft, NTTP  Ext: No pain or swelling, NTTP    A&P: Patient is a 32yo  @38w5d sPEC/Mg IOL (by mild range BPs and HA). Course c/b O2 desaturation overnight, possibly 2/2 mechanical/material error v. pulmonary pathology (i.e. pulmonary edema).    -Given that patient is currently hemodynamically stable with normal VS and benign physical exam will defer MICU consult at this time. However will obtain CXR and ABG to better evaluate current patient acid-base status.   -UOP adequate, 700cc/11h=66cc/h. Patient declining laguna catheter. Will continue strict Is and Os via spontaneous voids.  -Will re-evaluate in 2h.    Delaney Trevizo, PGY3  D/W Dr. Larsen Patient evaluated bedside. Patient with O2 desaturation overnight, on supplemental O2 for desaturation overnight to low 90s. Pulse oximeter relocated to patient ear and supplemental O2 discontinued. Patient now saturating 96-98% on RA while bedside. Patient denies any SOB, CP, N/V. She does report some mild lightheadedness on the Mg and return of her headache, but denies any visual disturbances.     VS  T(C): 36.6 (21 @ 05:40)  HR: 101 (21 @ 08:41)  BP: 125/81 (21 @ 08:41)  RR: 17 (02-15-21 @ 21:40)  SpO2: 98% (21 @ 08:40)    Physical Exam:  General: NAD  CV: S1/S2+, RRR  Lungs: CTA Franc  Abdomen: Soft, NTTP  Ext: No pain or swelling, NTTP    Lung BSUS neg for [place]    A&P: Patient is a 30yo  @38w5d sPEC/Mg IOL (by mild range BPs and HA). Course c/b O2 desaturation overnight, possibly 2/2 mechanical/material error v. pulmonary pathology (i.e. pulmonary edema).    -Given that patient is currently hemodynamically stable with normal VS and benign physical exam will defer MICU consult at this time. However will obtain CXR and ABG to better evaluate current patient acid-base status.   -UOP adequate, 700cc/11h=66cc/h. Patient declining laguna catheter. Will continue strict Is and Os via spontaneous voids.  -Will re-evaluate in 2h.    Delaney Trevizo, PGY3  D/W Dr. Larsen

## 2021-02-16 NOTE — OB PROVIDER LABOR PROGRESS NOTE - NS_SUBJECTIVE/OBJECTIVE_OBGYN_ALL_OB_FT
Pt examined at bedside to see if the balloon is in place. Balloon in place and cervix 3cm around the balloon Pt examined at bedside to see if the balloon is in place. Balloon in place and cervix 3cm around the balloon. Pt currently afebrile

## 2021-02-16 NOTE — CHART NOTE - NSCHARTNOTEFT_GEN_A_CORE
VS  T(C): 36.6 (02-16-21 @ 09:30)  HR: 110 (02-16-21 @ 13:11)  BP: 129/79 (02-16-21 @ 13:11)  RR: 18 (02-16-21 @ 09:30)  SpO2: 98% (02-16-21 @ 13:05)    Physical Exam:  General: NAD  CV: S1/S2+, RRR  Lungs: CTA Franc  Abdomen: Soft, NTTP, BS+ (Fundus?)  Incision: CDI  Ext: No pain or swelling    CXR  ABG Patient evaluated bedside. She once again denies any SOB, CP, N/V, RUQ pain.    VS  T(C): 36.6 (21 @ 09:30)  HR: 110 (21 @ 13:11)  BP: 129/79 (21 @ 13:11)  RR: 18 (21 @ 09:30)  SpO2: 98% (21 @ 13:05)    Physical Exam:  General: NAD  CV: S1/S2+, RRR  Lungs: CTA Franc  Abdomen: Soft, NTTP  Ext: No pain or swelling    CXR performed, final read pending    A&P: Patient is a 32yo  @38w5d sPEC/Mg IOL (by mild range BPs and HA). Course c/b O2 desaturation overnight. ABG wnl. CXR performed, final read pending. O2 saturation remains high 90%s on RA with replacement of pulse oximeter. Therefore as previously discussed, likely mechanical error. Will continue to monitor VS and UOP closely.     Delaney Trevizo, PGY3

## 2021-02-16 NOTE — CHART NOTE - NSCHARTNOTEFT_GEN_A_CORE
R3 Chart Note    Patient evaluated at bedside due to desaturation to low 90s on room air. Patient reports feeling stuffy in her nose throughout her pregnancy, but denies SOB, CP, lightheadedness, dizziness. Pulse ox cord and sticker changed with no remarkable difference in O2 saturation.     COVID neg     Physical Exam:   General: sitting comfortably in bed, NAD, AOx3   CV: RRR S1 S2   Lungs: CTA b/l, good air flow b/l   Abd: soft, gravid, NTND   Ext: NT b/l, no edema    Bedside sono: no evidence of B lines    32 yo  at 38w5d admitted for IOL for sPEC with mildly elevated BPs, persistent HA. Patient with noted desaturation to low 90s, placed on 2L NC.   - Lungs clear to auscultation, no evidence for fluid overload on exam   - Lung sono neg for B lines  - F/u Mg level, HELLP labs   - c/w PO cytotec, CB@11pm for IOL   - c/w Mg for seizure ppx  - supplemental O2 PRN     E Demirel PGY3  d/w Dr. Kelley-Дмитрий R3 Chart Note    Patient evaluated at bedside due to desaturation to low 90s on room air. Patient reports feeling stuffy in her nose throughout her pregnancy, but denies SOB, CP, lightheadedness, dizziness.     COVID neg     Physical Exam:   General: sitting comfortably in bed, NAD, AOx3   CV: RRR S1 S2   Lungs: CTA b/l, good air flow b/l   Abd: soft, gravid, NTND   Ext: NT b/l, no edema    Bedside sono: no evidence of B lines    30 yo  at 38w5d admitted for IOL for sPEC with mildly elevated BPs, persistent HA. Patient with noted desaturation to low 90s, placed on 2L NC.   - Pulse ox cord and sticker changed for possible equipment issue   - Lungs clear to auscultation, no evidence for fluid overload on exam   - Lung sono neg for B lines  - F/u Mg level, HELLP labs   - c/w PO cytotec, CB@11pm for IOL   - c/w Mg for seizure ppx  - supplemental O2 PRN     E Demirel PGY3  d/w Dr. Kelley-Дмитрий

## 2021-02-17 ENCOUNTER — RESULT REVIEW (OUTPATIENT)
Age: 31
End: 2021-02-17

## 2021-02-17 LAB
ALBUMIN SERPL ELPH-MCNC: 3.3 G/DL — SIGNIFICANT CHANGE UP (ref 3.3–5)
ALBUMIN SERPL ELPH-MCNC: 3.4 G/DL — SIGNIFICANT CHANGE UP (ref 3.3–5)
ALBUMIN SERPL ELPH-MCNC: 3.4 G/DL — SIGNIFICANT CHANGE UP (ref 3.3–5)
ALP SERPL-CCNC: 217 U/L — HIGH (ref 40–120)
ALP SERPL-CCNC: 221 U/L — HIGH (ref 40–120)
ALP SERPL-CCNC: 222 U/L — HIGH (ref 40–120)
ALT FLD-CCNC: 11 U/L — SIGNIFICANT CHANGE UP (ref 4–33)
ALT FLD-CCNC: 13 U/L — SIGNIFICANT CHANGE UP (ref 4–33)
ALT FLD-CCNC: 13 U/L — SIGNIFICANT CHANGE UP (ref 4–33)
ANION GAP SERPL CALC-SCNC: 12 MMOL/L — SIGNIFICANT CHANGE UP (ref 7–14)
ANION GAP SERPL CALC-SCNC: 14 MMOL/L — SIGNIFICANT CHANGE UP (ref 7–14)
ANION GAP SERPL CALC-SCNC: 16 MMOL/L — HIGH (ref 7–14)
APTT BLD: 26.6 SEC — LOW (ref 27–36.3)
APTT BLD: 27.1 SEC — SIGNIFICANT CHANGE UP (ref 27–36.3)
APTT BLD: 27.7 SEC — SIGNIFICANT CHANGE UP (ref 27–36.3)
AST SERPL-CCNC: 21 U/L — SIGNIFICANT CHANGE UP (ref 4–32)
AST SERPL-CCNC: 21 U/L — SIGNIFICANT CHANGE UP (ref 4–32)
AST SERPL-CCNC: 23 U/L — SIGNIFICANT CHANGE UP (ref 4–32)
BASOPHILS # BLD AUTO: 0.03 K/UL — SIGNIFICANT CHANGE UP (ref 0–0.2)
BASOPHILS NFR BLD AUTO: 0.3 % — SIGNIFICANT CHANGE UP (ref 0–2)
BILIRUB SERPL-MCNC: 0.5 MG/DL — SIGNIFICANT CHANGE UP (ref 0.2–1.2)
BILIRUB SERPL-MCNC: 0.6 MG/DL — SIGNIFICANT CHANGE UP (ref 0.2–1.2)
BILIRUB SERPL-MCNC: 0.6 MG/DL — SIGNIFICANT CHANGE UP (ref 0.2–1.2)
BUN SERPL-MCNC: 4 MG/DL — LOW (ref 7–23)
CALCIUM SERPL-MCNC: 7.1 MG/DL — LOW (ref 8.4–10.5)
CALCIUM SERPL-MCNC: 7.3 MG/DL — LOW (ref 8.4–10.5)
CALCIUM SERPL-MCNC: 7.5 MG/DL — LOW (ref 8.4–10.5)
CHLORIDE SERPL-SCNC: 97 MMOL/L — LOW (ref 98–107)
CHLORIDE SERPL-SCNC: 98 MMOL/L — SIGNIFICANT CHANGE UP (ref 98–107)
CHLORIDE SERPL-SCNC: 98 MMOL/L — SIGNIFICANT CHANGE UP (ref 98–107)
CO2 SERPL-SCNC: 18 MMOL/L — LOW (ref 22–31)
CO2 SERPL-SCNC: 20 MMOL/L — LOW (ref 22–31)
CO2 SERPL-SCNC: 20 MMOL/L — LOW (ref 22–31)
CREAT SERPL-MCNC: 0.53 MG/DL — SIGNIFICANT CHANGE UP (ref 0.5–1.3)
CREAT SERPL-MCNC: 0.53 MG/DL — SIGNIFICANT CHANGE UP (ref 0.5–1.3)
CREAT SERPL-MCNC: 0.67 MG/DL — SIGNIFICANT CHANGE UP (ref 0.5–1.3)
EOSINOPHIL # BLD AUTO: 0.02 K/UL — SIGNIFICANT CHANGE UP (ref 0–0.5)
EOSINOPHIL # BLD AUTO: 0.02 K/UL — SIGNIFICANT CHANGE UP (ref 0–0.5)
EOSINOPHIL # BLD AUTO: 0.03 K/UL — SIGNIFICANT CHANGE UP (ref 0–0.5)
EOSINOPHIL NFR BLD AUTO: 0.2 % — SIGNIFICANT CHANGE UP (ref 0–6)
EOSINOPHIL NFR BLD AUTO: 0.2 % — SIGNIFICANT CHANGE UP (ref 0–6)
EOSINOPHIL NFR BLD AUTO: 0.3 % — SIGNIFICANT CHANGE UP (ref 0–6)
FIBRINOGEN PPP-MCNC: 619 MG/DL — HIGH (ref 290–520)
FIBRINOGEN PPP-MCNC: 627 MG/DL — HIGH (ref 290–520)
FIBRINOGEN PPP-MCNC: 644 MG/DL — HIGH (ref 290–520)
GLUCOSE SERPL-MCNC: 103 MG/DL — HIGH (ref 70–99)
GLUCOSE SERPL-MCNC: 95 MG/DL — SIGNIFICANT CHANGE UP (ref 70–99)
GLUCOSE SERPL-MCNC: 96 MG/DL — SIGNIFICANT CHANGE UP (ref 70–99)
HCT VFR BLD CALC: 36.4 % — SIGNIFICANT CHANGE UP (ref 34.5–45)
HCT VFR BLD CALC: 38.3 % — SIGNIFICANT CHANGE UP (ref 34.5–45)
HCT VFR BLD CALC: 38.9 % — SIGNIFICANT CHANGE UP (ref 34.5–45)
HGB BLD-MCNC: 12.3 G/DL — SIGNIFICANT CHANGE UP (ref 11.5–15.5)
HGB BLD-MCNC: 12.6 G/DL — SIGNIFICANT CHANGE UP (ref 11.5–15.5)
HGB BLD-MCNC: 13.1 G/DL — SIGNIFICANT CHANGE UP (ref 11.5–15.5)
IANC: 7.56 K/UL — SIGNIFICANT CHANGE UP (ref 1.5–8.5)
IANC: 8.16 K/UL — SIGNIFICANT CHANGE UP (ref 1.5–8.5)
IANC: 8.81 K/UL — HIGH (ref 1.5–8.5)
IMM GRANULOCYTES NFR BLD AUTO: 0.8 % — SIGNIFICANT CHANGE UP (ref 0–1.5)
IMM GRANULOCYTES NFR BLD AUTO: 0.9 % — SIGNIFICANT CHANGE UP (ref 0–1.5)
IMM GRANULOCYTES NFR BLD AUTO: 1.2 % — SIGNIFICANT CHANGE UP (ref 0–1.5)
INR BLD: 0.96 RATIO — SIGNIFICANT CHANGE UP (ref 0.88–1.16)
INR BLD: 0.97 RATIO — SIGNIFICANT CHANGE UP (ref 0.88–1.16)
INR BLD: 0.98 RATIO — SIGNIFICANT CHANGE UP (ref 0.88–1.16)
LDH SERPL L TO P-CCNC: 208 U/L — SIGNIFICANT CHANGE UP (ref 135–225)
LDH SERPL L TO P-CCNC: 210 U/L — SIGNIFICANT CHANGE UP (ref 135–225)
LDH SERPL L TO P-CCNC: 214 U/L — SIGNIFICANT CHANGE UP (ref 135–225)
LYMPHOCYTES # BLD AUTO: 0.68 K/UL — LOW (ref 1–3.3)
LYMPHOCYTES # BLD AUTO: 0.68 K/UL — LOW (ref 1–3.3)
LYMPHOCYTES # BLD AUTO: 0.89 K/UL — LOW (ref 1–3.3)
LYMPHOCYTES # BLD AUTO: 6.6 % — LOW (ref 13–44)
LYMPHOCYTES # BLD AUTO: 7.1 % — LOW (ref 13–44)
LYMPHOCYTES # BLD AUTO: 9.8 % — LOW (ref 13–44)
MAGNESIUM SERPL-MCNC: 6.2 MG/DL — HIGH (ref 1.6–2.6)
MAGNESIUM SERPL-MCNC: 6.3 MG/DL — HIGH (ref 1.6–2.6)
MCHC RBC-ENTMCNC: 30.1 PG — SIGNIFICANT CHANGE UP (ref 27–34)
MCHC RBC-ENTMCNC: 30.8 PG — SIGNIFICANT CHANGE UP (ref 27–34)
MCHC RBC-ENTMCNC: 31 PG — SIGNIFICANT CHANGE UP (ref 27–34)
MCHC RBC-ENTMCNC: 32.4 GM/DL — SIGNIFICANT CHANGE UP (ref 32–36)
MCHC RBC-ENTMCNC: 33.8 GM/DL — SIGNIFICANT CHANGE UP (ref 32–36)
MCHC RBC-ENTMCNC: 34.2 GM/DL — SIGNIFICANT CHANGE UP (ref 32–36)
MCV RBC AUTO: 90.8 FL — SIGNIFICANT CHANGE UP (ref 80–100)
MCV RBC AUTO: 91.2 FL — SIGNIFICANT CHANGE UP (ref 80–100)
MCV RBC AUTO: 92.8 FL — SIGNIFICANT CHANGE UP (ref 80–100)
MONOCYTES # BLD AUTO: 0.51 K/UL — SIGNIFICANT CHANGE UP (ref 0–0.9)
MONOCYTES # BLD AUTO: 0.62 K/UL — SIGNIFICANT CHANGE UP (ref 0–0.9)
MONOCYTES # BLD AUTO: 0.67 K/UL — SIGNIFICANT CHANGE UP (ref 0–0.9)
MONOCYTES NFR BLD AUTO: 5.6 % — SIGNIFICANT CHANGE UP (ref 2–14)
MONOCYTES NFR BLD AUTO: 6.5 % — SIGNIFICANT CHANGE UP (ref 2–14)
MONOCYTES NFR BLD AUTO: 6.5 % — SIGNIFICANT CHANGE UP (ref 2–14)
NEUTROPHILS # BLD AUTO: 7.56 K/UL — HIGH (ref 1.8–7.4)
NEUTROPHILS # BLD AUTO: 8.16 K/UL — HIGH (ref 1.8–7.4)
NEUTROPHILS # BLD AUTO: 8.81 K/UL — HIGH (ref 1.8–7.4)
NEUTROPHILS NFR BLD AUTO: 83.2 % — HIGH (ref 43–77)
NEUTROPHILS NFR BLD AUTO: 85 % — HIGH (ref 43–77)
NEUTROPHILS NFR BLD AUTO: 85.2 % — HIGH (ref 43–77)
NRBC # BLD: 0 /100 WBCS — SIGNIFICANT CHANGE UP
NRBC # FLD: 0 K/UL — SIGNIFICANT CHANGE UP
PLATELET # BLD AUTO: 109 K/UL — LOW (ref 150–400)
PLATELET # BLD AUTO: 112 K/UL — LOW (ref 150–400)
PLATELET # BLD AUTO: 125 K/UL — LOW (ref 150–400)
POTASSIUM SERPL-MCNC: 3.4 MMOL/L — LOW (ref 3.5–5.3)
POTASSIUM SERPL-MCNC: 3.5 MMOL/L — SIGNIFICANT CHANGE UP (ref 3.5–5.3)
POTASSIUM SERPL-MCNC: 3.6 MMOL/L — SIGNIFICANT CHANGE UP (ref 3.5–5.3)
POTASSIUM SERPL-SCNC: 3.4 MMOL/L — LOW (ref 3.5–5.3)
POTASSIUM SERPL-SCNC: 3.5 MMOL/L — SIGNIFICANT CHANGE UP (ref 3.5–5.3)
POTASSIUM SERPL-SCNC: 3.6 MMOL/L — SIGNIFICANT CHANGE UP (ref 3.5–5.3)
PROT SERPL-MCNC: 6.3 G/DL — SIGNIFICANT CHANGE UP (ref 6–8.3)
PROTHROM AB SERPL-ACNC: 11 SEC — SIGNIFICANT CHANGE UP (ref 10.6–13.6)
PROTHROM AB SERPL-ACNC: 11.2 SEC — SIGNIFICANT CHANGE UP (ref 10.6–13.6)
PROTHROM AB SERPL-ACNC: 11.2 SEC — SIGNIFICANT CHANGE UP (ref 10.6–13.6)
RBC # BLD: 3.99 M/UL — SIGNIFICANT CHANGE UP (ref 3.8–5.2)
RBC # BLD: 4.19 M/UL — SIGNIFICANT CHANGE UP (ref 3.8–5.2)
RBC # BLD: 4.22 M/UL — SIGNIFICANT CHANGE UP (ref 3.8–5.2)
RBC # FLD: 14.2 % — SIGNIFICANT CHANGE UP (ref 10.3–14.5)
RBC # FLD: 14.3 % — SIGNIFICANT CHANGE UP (ref 10.3–14.5)
RBC # FLD: 14.4 % — SIGNIFICANT CHANGE UP (ref 10.3–14.5)
SODIUM SERPL-SCNC: 130 MMOL/L — LOW (ref 135–145)
SODIUM SERPL-SCNC: 131 MMOL/L — LOW (ref 135–145)
SODIUM SERPL-SCNC: 132 MMOL/L — LOW (ref 135–145)
URATE SERPL-MCNC: 7 MG/DL — SIGNIFICANT CHANGE UP (ref 2.5–7)
URATE SERPL-MCNC: 7.1 MG/DL — HIGH (ref 2.5–7)
URATE SERPL-MCNC: 7.6 MG/DL — HIGH (ref 2.5–7)
WBC # BLD: 10.33 K/UL — SIGNIFICANT CHANGE UP (ref 3.8–10.5)
WBC # BLD: 9.09 K/UL — SIGNIFICANT CHANGE UP (ref 3.8–10.5)
WBC # BLD: 9.6 K/UL — SIGNIFICANT CHANGE UP (ref 3.8–10.5)
WBC # FLD AUTO: 10.33 K/UL — SIGNIFICANT CHANGE UP (ref 3.8–10.5)
WBC # FLD AUTO: 9.09 K/UL — SIGNIFICANT CHANGE UP (ref 3.8–10.5)
WBC # FLD AUTO: 9.6 K/UL — SIGNIFICANT CHANGE UP (ref 3.8–10.5)

## 2021-02-17 PROCEDURE — 88307 TISSUE EXAM BY PATHOLOGIST: CPT | Mod: 26

## 2021-02-17 RX ORDER — DIPHENHYDRAMINE HCL 50 MG
25 CAPSULE ORAL EVERY 6 HOURS
Refills: 0 | Status: DISCONTINUED | OUTPATIENT
Start: 2021-02-17 | End: 2021-02-19

## 2021-02-17 RX ORDER — AMPICILLIN TRIHYDRATE 250 MG
2 CAPSULE ORAL ONCE
Refills: 0 | Status: COMPLETED | OUTPATIENT
Start: 2021-02-17 | End: 2021-02-17

## 2021-02-17 RX ORDER — AMPICILLIN TRIHYDRATE 250 MG
CAPSULE ORAL
Refills: 0 | Status: DISCONTINUED | OUTPATIENT
Start: 2021-02-17 | End: 2021-02-18

## 2021-02-17 RX ORDER — OXYCODONE HYDROCHLORIDE 5 MG/1
5 TABLET ORAL ONCE
Refills: 0 | Status: DISCONTINUED | OUTPATIENT
Start: 2021-02-17 | End: 2021-02-20

## 2021-02-17 RX ORDER — PRAMOXINE HYDROCHLORIDE 150 MG/15G
1 AEROSOL, FOAM RECTAL EVERY 4 HOURS
Refills: 0 | Status: DISCONTINUED | OUTPATIENT
Start: 2021-02-17 | End: 2021-02-20

## 2021-02-17 RX ORDER — OXYTOCIN 10 UNIT/ML
2 VIAL (ML) INJECTION
Qty: 30 | Refills: 0 | Status: DISCONTINUED | OUTPATIENT
Start: 2021-02-17 | End: 2021-02-18

## 2021-02-17 RX ORDER — MAGNESIUM HYDROXIDE 400 MG/1
30 TABLET, CHEWABLE ORAL
Refills: 0 | Status: DISCONTINUED | OUTPATIENT
Start: 2021-02-17 | End: 2021-02-20

## 2021-02-17 RX ORDER — ACETAMINOPHEN 500 MG
975 TABLET ORAL ONCE
Refills: 0 | Status: COMPLETED | OUTPATIENT
Start: 2021-02-17 | End: 2021-02-17

## 2021-02-17 RX ORDER — ACETAMINOPHEN 500 MG
975 TABLET ORAL
Refills: 0 | Status: DISCONTINUED | OUTPATIENT
Start: 2021-02-17 | End: 2021-02-20

## 2021-02-17 RX ORDER — KETOROLAC TROMETHAMINE 30 MG/ML
30 SYRINGE (ML) INJECTION ONCE
Refills: 0 | Status: DISCONTINUED | OUTPATIENT
Start: 2021-02-17 | End: 2021-02-17

## 2021-02-17 RX ORDER — BENZOCAINE 10 %
1 GEL (GRAM) MUCOUS MEMBRANE EVERY 6 HOURS
Refills: 0 | Status: DISCONTINUED | OUTPATIENT
Start: 2021-02-17 | End: 2021-02-20

## 2021-02-17 RX ORDER — DIBUCAINE 1 %
1 OINTMENT (GRAM) RECTAL EVERY 6 HOURS
Refills: 0 | Status: DISCONTINUED | OUTPATIENT
Start: 2021-02-17 | End: 2021-02-20

## 2021-02-17 RX ORDER — AER TRAVELER 0.5 G/1
1 SOLUTION RECTAL; TOPICAL EVERY 4 HOURS
Refills: 0 | Status: DISCONTINUED | OUTPATIENT
Start: 2021-02-17 | End: 2021-02-20

## 2021-02-17 RX ORDER — LANOLIN
1 OINTMENT (GRAM) TOPICAL EVERY 6 HOURS
Refills: 0 | Status: DISCONTINUED | OUTPATIENT
Start: 2021-02-17 | End: 2021-02-20

## 2021-02-17 RX ORDER — SIMETHICONE 80 MG/1
80 TABLET, CHEWABLE ORAL EVERY 4 HOURS
Refills: 0 | Status: DISCONTINUED | OUTPATIENT
Start: 2021-02-17 | End: 2021-02-20

## 2021-02-17 RX ORDER — IBUPROFEN 200 MG
600 TABLET ORAL EVERY 6 HOURS
Refills: 0 | Status: COMPLETED | OUTPATIENT
Start: 2021-02-17 | End: 2022-01-16

## 2021-02-17 RX ORDER — MAGNESIUM SULFATE 500 MG/ML
1 VIAL (ML) INJECTION
Qty: 40 | Refills: 0 | Status: COMPLETED | OUTPATIENT
Start: 2021-02-17 | End: 2021-02-18

## 2021-02-17 RX ORDER — GENTAMICIN SULFATE 40 MG/ML
315 VIAL (ML) INJECTION ONCE
Refills: 0 | Status: COMPLETED | OUTPATIENT
Start: 2021-02-17 | End: 2021-02-17

## 2021-02-17 RX ORDER — TETANUS TOXOID, REDUCED DIPHTHERIA TOXOID AND ACELLULAR PERTUSSIS VACCINE, ADSORBED 5; 2.5; 8; 8; 2.5 [IU]/.5ML; [IU]/.5ML; UG/.5ML; UG/.5ML; UG/.5ML
0.5 SUSPENSION INTRAMUSCULAR ONCE
Refills: 0 | Status: DISCONTINUED | OUTPATIENT
Start: 2021-02-17 | End: 2021-02-20

## 2021-02-17 RX ORDER — OXYCODONE HYDROCHLORIDE 5 MG/1
5 TABLET ORAL
Refills: 0 | Status: DISCONTINUED | OUTPATIENT
Start: 2021-02-17 | End: 2021-02-20

## 2021-02-17 RX ORDER — AMPICILLIN TRIHYDRATE 250 MG
2 CAPSULE ORAL EVERY 6 HOURS
Refills: 0 | Status: DISCONTINUED | OUTPATIENT
Start: 2021-02-18 | End: 2021-02-18

## 2021-02-17 RX ORDER — SODIUM CHLORIDE 9 MG/ML
3 INJECTION INTRAMUSCULAR; INTRAVENOUS; SUBCUTANEOUS EVERY 8 HOURS
Refills: 0 | Status: DISCONTINUED | OUTPATIENT
Start: 2021-02-17 | End: 2021-02-20

## 2021-02-17 RX ORDER — OXYTOCIN 10 UNIT/ML
25 VIAL (ML) INJECTION
Qty: 20 | Refills: 0 | Status: DISCONTINUED | OUTPATIENT
Start: 2021-02-17 | End: 2021-02-19

## 2021-02-17 RX ORDER — HYDROCORTISONE 1 %
1 OINTMENT (GRAM) TOPICAL EVERY 6 HOURS
Refills: 0 | Status: DISCONTINUED | OUTPATIENT
Start: 2021-02-17 | End: 2021-02-19

## 2021-02-17 RX ADMIN — Medication 1 SPRAY(S): at 01:02

## 2021-02-17 RX ADMIN — Medication 216 GRAM(S): at 20:00

## 2021-02-17 RX ADMIN — Medication 20 UNIT(S): at 23:15

## 2021-02-17 RX ADMIN — CARBOPROST TROMETHAMINE 250 MICROGRAM(S): 250 INJECTION, SOLUTION INTRAMUSCULAR at 23:15

## 2021-02-17 RX ADMIN — Medication 975 MILLIGRAM(S): at 18:43

## 2021-02-17 RX ADMIN — Medication 2 MILLIUNIT(S)/MIN: at 00:11

## 2021-02-17 RX ADMIN — Medication 500 MILLIGRAM(S): at 20:36

## 2021-02-17 RX ADMIN — TRANEXAMIC ACID 220 MILLIGRAM(S): 100 INJECTION, SOLUTION INTRAVENOUS at 23:45

## 2021-02-17 RX ADMIN — Medication 1 TABLET(S): at 23:30

## 2021-02-17 RX ADMIN — Medication 50 GM/HR: at 07:09

## 2021-02-17 RX ADMIN — Medication 2 MILLIUNIT(S)/MIN: at 16:42

## 2021-02-17 RX ADMIN — SODIUM CHLORIDE 50 MILLILITER(S): 9 INJECTION, SOLUTION INTRAVENOUS at 20:36

## 2021-02-17 RX ADMIN — Medication 50 GM/HR: at 19:19

## 2021-02-17 RX ADMIN — Medication 2 MILLIUNIT(S)/MIN: at 20:36

## 2021-02-17 NOTE — OB NEONATOLOGY/PEDIATRICIAN DELIVERY SUMMARY - NSPEDSNEONOTESA_OBGYN_ALL_OB_FT
Baby Girl Sergio born at 38+6 to a 32yo  O+, PNL neg/NR/I, GBS neg () mother. Peds called to delivery for Cat 2 tracing. No significant prenatal complications or maternal hx. Covid negative. AROM clear fluid on  at 1600 (8 hour ROM). Highest maternal temp 37.9. Received amp >2 hours prior to delivery. Baby emerged vigorous and crying. Delayed cord clamping 45s. WDSS. Apgar 9/9. Admit to . Wants to breastfeed, wants Hep B vaccine. Peds: Vera Jaqruin

## 2021-02-17 NOTE — CHART NOTE - NSCHARTNOTEFT_GEN_A_CORE
Attending Note     Notified of temperature 38.0   And feeling contraction pain     temp 38  Gen in NAD   Abd soft, gravid  Ext: no c/c/e    SVE 5.5/60/-3   FHTs 165 mod perla no decels + accels   TOCO q 2-5 min pit 10 mu/min     A/P: nullip at term here for IOL for pre-eclampsia with severe features now with chorio based upon temperature and fetal tachycardia   continue mg  continue pitocin  will start abx and tylenol  will call for epidural     R Leena GOMEZ

## 2021-02-17 NOTE — OB PROVIDER LABOR PROGRESS NOTE - NS_SUBJECTIVE/OBJECTIVE_OBGYN_ALL_OB_FT
PGY1 Labor & Delivery Progress Note     Pt seen & examined at bedside.    T(C): 36.6 (02-17-21 @ 04:15), Max: 36.9 (02-16-21 @ 13:30)  HR: 120 (02-17-21 @ 06:04) (89 - 127)  BP: 123/58 (02-17-21 @ 06:00) (113/72 - 150/110)  RR: 18 (02-16-21 @ 16:40) (18 - 18)  SpO2: 96% (02-17-21 @ 05:59) (92% - 100%)

## 2021-02-17 NOTE — CHART NOTE - NSCHARTNOTEFT_GEN_A_CORE
PA Note    seen & examined for cont of management. Denies pain with contractions. Denies HA, visual changes, epigastric pain, N/V. Patient on 20MU pitocin at this time    VS  T(C): 37.2 (02-17-21 @ 07:58)  HR: 107 (02-17-21 @ 08:45)  BP: 123/76 (02-17-21 @ 08:45)  RR: 18 (02-16-21 @ 16:40)  SpO2: 97% (02-17-21 @ 08:44)    /mod perla/+accels/no decels  Hiram irreg q 2-6min   VE 3.5/60/-3    cont efm/toco  BPs controlled  continue Mg  discussed with DR Green, will discontinue pitocin at this time & switch to vaginal cytotec for further cervical ripening   patient expressed understanding of above    michael glynn

## 2021-02-17 NOTE — OB PROVIDER LABOR PROGRESS NOTE - NS_OBIHIFHRDETAILS_OBGYN_ALL_OB_FT
145, mod, +accel, -decel
indeterminate baseline
130 mod perla +accel -decel
145, mod, +accels, -decels
135, mod perla, +accels, -decels
EFM: 130/mod. variability/+accles/-decels

## 2021-02-17 NOTE — OB PROVIDER LABOR PROGRESS NOTE - ASSESSMENT
AROM @315p clear fluid  patient for Pit  patient comfortable at this time and does not want pain intervention    d/w Dr. Peter Dominguez, PGY2
Plan: 30y/o  @38w6d in stable condition  - Con't IOL w/ pitocin  - Continuous EFM, [place]  - Con't IVF    D/W attending physician Dr. Sami Hu MD   PGY-1
 at 38+6 IOL for sPEC, c/b chorio, cat 1 tracing  - start pushing   - anticipate      dw Dr. Warren Malagon PGY2
32yo  IOL sPEC  - cervical balloon still in place  - continuous monitoring  - hold off on PO dose  - routine labor care  - anticipate   - maternal resuscitation     d/w Dr. Taurus Maharaj PGY1
Plan   cervical balloon placed  continue PO  EFM Cat 1  Continue EFM/[place]  Anticipate      Minal Ponce MD PGY1  d/w Dr. Stern 
IOL sPEC on Mg  - s/p PO, CB, Pit  - 2nd vaginal cytotec placed  - continuous monioring  - routine labor care    Joy Maharaj PGY1

## 2021-02-17 NOTE — CHART NOTE - NSCHARTNOTEFT_GEN_A_CORE
Cat 1 fhr tracing, fhr 130 with mod variability, accels, no decels  contractions q6-7min    vaginal cytotec 25mcg placed without difficulty  previous SVE 3.5/60/-3    Vital Signs Last 24 Hrs  T(C): 37.2 (17 Feb 2021 07:58), Max: 37.2 (17 Feb 2021 07:58)  T(F): 98.96 (17 Feb 2021 07:58), Max: 98.96 (17 Feb 2021 07:58)  HR: 114 (17 Feb 2021 09:54) (94 - 127)  BP: 128/69 (17 Feb 2021 09:45) (113/72 - 150/110)  BP(mean): --  RR: 18 (16 Feb 2021 16:40) (18 - 18)  SpO2: 97% (17 Feb 2021 09:54) (93% - 100%)    PAM Madison NP

## 2021-02-17 NOTE — OB PROVIDER LABOR PROGRESS NOTE - NSVAGINALEXAM_OBGYN_ALL_OB_DT
15-Feb-2021 23:21
17-Feb-2021 12:54
17-Feb-2021 06:10
17-Feb-2021 16:13
17-Feb-2021 22:29
16-Feb-2021 13:42

## 2021-02-18 DIAGNOSIS — O41.1290 CHORIOAMNIONITIS, UNSPECIFIED TRIMESTER, NOT APPLICABLE OR UNSPECIFIED: ICD-10-CM

## 2021-02-18 DIAGNOSIS — O14.10 SEVERE PRE-ECLAMPSIA, UNSPECIFIED TRIMESTER: ICD-10-CM

## 2021-02-18 LAB
ALBUMIN SERPL ELPH-MCNC: 2 G/DL — LOW (ref 3.3–5)
ALBUMIN SERPL ELPH-MCNC: 2.1 G/DL — LOW (ref 3.3–5)
ALBUMIN SERPL ELPH-MCNC: 2.4 G/DL — LOW (ref 3.3–5)
ALP SERPL-CCNC: 138 U/L — HIGH (ref 40–120)
ALP SERPL-CCNC: 142 U/L — HIGH (ref 40–120)
ALP SERPL-CCNC: 147 U/L — HIGH (ref 40–120)
ALT FLD-CCNC: 11 U/L — SIGNIFICANT CHANGE UP (ref 4–33)
ALT FLD-CCNC: 14 U/L — SIGNIFICANT CHANGE UP (ref 4–33)
ALT FLD-CCNC: 7 U/L — SIGNIFICANT CHANGE UP (ref 4–33)
ANION GAP SERPL CALC-SCNC: 10 MMOL/L — SIGNIFICANT CHANGE UP (ref 7–14)
ANION GAP SERPL CALC-SCNC: 9 MMOL/L — SIGNIFICANT CHANGE UP (ref 7–14)
ANION GAP SERPL CALC-SCNC: 9 MMOL/L — SIGNIFICANT CHANGE UP (ref 7–14)
APTT BLD: 27.9 SEC — SIGNIFICANT CHANGE UP (ref 27–36.3)
APTT BLD: 28.1 SEC — SIGNIFICANT CHANGE UP (ref 27–36.3)
APTT BLD: 28.8 SEC — SIGNIFICANT CHANGE UP (ref 27–36.3)
APTT BLD: 29.4 SEC — SIGNIFICANT CHANGE UP (ref 27–36.3)
AST SERPL-CCNC: 20 U/L — SIGNIFICANT CHANGE UP (ref 4–32)
AST SERPL-CCNC: 24 U/L — SIGNIFICANT CHANGE UP (ref 4–32)
AST SERPL-CCNC: 26 U/L — SIGNIFICANT CHANGE UP (ref 4–32)
BASOPHILS # BLD AUTO: 0 K/UL — SIGNIFICANT CHANGE UP (ref 0–0.2)
BASOPHILS # BLD AUTO: 0.03 K/UL — SIGNIFICANT CHANGE UP (ref 0–0.2)
BASOPHILS # BLD AUTO: 0.03 K/UL — SIGNIFICANT CHANGE UP (ref 0–0.2)
BASOPHILS # BLD AUTO: 0.04 K/UL — SIGNIFICANT CHANGE UP (ref 0–0.2)
BASOPHILS NFR BLD AUTO: 0 % — SIGNIFICANT CHANGE UP (ref 0–2)
BASOPHILS NFR BLD AUTO: 0.2 % — SIGNIFICANT CHANGE UP (ref 0–2)
BILIRUB SERPL-MCNC: 0.4 MG/DL — SIGNIFICANT CHANGE UP (ref 0.2–1.2)
BILIRUB SERPL-MCNC: 0.5 MG/DL — SIGNIFICANT CHANGE UP (ref 0.2–1.2)
BILIRUB SERPL-MCNC: 0.5 MG/DL — SIGNIFICANT CHANGE UP (ref 0.2–1.2)
BLD GP AB SCN SERPL QL: NEGATIVE — SIGNIFICANT CHANGE UP
BUN SERPL-MCNC: 4 MG/DL — LOW (ref 7–23)
BUN SERPL-MCNC: 5 MG/DL — LOW (ref 7–23)
BUN SERPL-MCNC: 6 MG/DL — LOW (ref 7–23)
CALCIUM SERPL-MCNC: 6.6 MG/DL — LOW (ref 8.4–10.5)
CALCIUM SERPL-MCNC: 6.7 MG/DL — LOW (ref 8.4–10.5)
CALCIUM SERPL-MCNC: 6.8 MG/DL — LOW (ref 8.4–10.5)
CHLORIDE SERPL-SCNC: 100 MMOL/L — SIGNIFICANT CHANGE UP (ref 98–107)
CHLORIDE SERPL-SCNC: 100 MMOL/L — SIGNIFICANT CHANGE UP (ref 98–107)
CHLORIDE SERPL-SCNC: 101 MMOL/L — SIGNIFICANT CHANGE UP (ref 98–107)
CHLORIDE SERPL-SCNC: 98 MMOL/L — SIGNIFICANT CHANGE UP (ref 98–107)
CO2 SERPL-SCNC: 19 MMOL/L — LOW (ref 22–31)
CO2 SERPL-SCNC: 22 MMOL/L — SIGNIFICANT CHANGE UP (ref 22–31)
CO2 SERPL-SCNC: 22 MMOL/L — SIGNIFICANT CHANGE UP (ref 22–31)
CREAT SERPL-MCNC: 0.62 MG/DL — SIGNIFICANT CHANGE UP (ref 0.5–1.3)
CREAT SERPL-MCNC: 0.69 MG/DL — SIGNIFICANT CHANGE UP (ref 0.5–1.3)
CREAT SERPL-MCNC: 0.76 MG/DL — SIGNIFICANT CHANGE UP (ref 0.5–1.3)
EOSINOPHIL # BLD AUTO: 0 K/UL — SIGNIFICANT CHANGE UP (ref 0–0.5)
EOSINOPHIL # BLD AUTO: 0.01 K/UL — SIGNIFICANT CHANGE UP (ref 0–0.5)
EOSINOPHIL # BLD AUTO: 0.03 K/UL — SIGNIFICANT CHANGE UP (ref 0–0.5)
EOSINOPHIL # BLD AUTO: 0.13 K/UL — SIGNIFICANT CHANGE UP (ref 0–0.5)
EOSINOPHIL NFR BLD AUTO: 0 % — SIGNIFICANT CHANGE UP (ref 0–6)
EOSINOPHIL NFR BLD AUTO: 0.1 % — SIGNIFICANT CHANGE UP (ref 0–6)
EOSINOPHIL NFR BLD AUTO: 0.2 % — SIGNIFICANT CHANGE UP (ref 0–6)
EOSINOPHIL NFR BLD AUTO: 0.9 % — SIGNIFICANT CHANGE UP (ref 0–6)
FIBRINOGEN PPP-MCNC: 467 MG/DL — SIGNIFICANT CHANGE UP (ref 290–520)
FIBRINOGEN PPP-MCNC: 489 MG/DL — SIGNIFICANT CHANGE UP (ref 290–520)
FIBRINOGEN PPP-MCNC: 532 MG/DL — HIGH (ref 290–520)
FIBRINOGEN PPP-MCNC: 580 MG/DL — HIGH (ref 290–520)
GLUCOSE SERPL-MCNC: 107 MG/DL — HIGH (ref 70–99)
GLUCOSE SERPL-MCNC: 109 MG/DL — HIGH (ref 70–99)
GLUCOSE SERPL-MCNC: 131 MG/DL — HIGH (ref 70–99)
HCT VFR BLD CALC: 24.4 % — LOW (ref 34.5–45)
HCT VFR BLD CALC: 24.5 % — LOW (ref 34.5–45)
HCT VFR BLD CALC: 27.3 % — LOW (ref 34.5–45)
HCT VFR BLD CALC: 28 % — LOW (ref 34.5–45)
HGB BLD-MCNC: 8.4 G/DL — LOW (ref 11.5–15.5)
HGB BLD-MCNC: 8.5 G/DL — LOW (ref 11.5–15.5)
HGB BLD-MCNC: 9 G/DL — LOW (ref 11.5–15.5)
HGB BLD-MCNC: 9.3 G/DL — LOW (ref 11.5–15.5)
IANC: 12.91 K/UL — HIGH (ref 1.5–8.5)
IANC: 14.17 K/UL — HIGH (ref 1.5–8.5)
IANC: 15.56 K/UL — HIGH (ref 1.5–8.5)
IANC: 9.78 K/UL — HIGH (ref 1.5–8.5)
IMM GRANULOCYTES NFR BLD AUTO: 0.6 % — SIGNIFICANT CHANGE UP (ref 0–1.5)
IMM GRANULOCYTES NFR BLD AUTO: 0.8 % — SIGNIFICANT CHANGE UP (ref 0–1.5)
IMM GRANULOCYTES NFR BLD AUTO: 1.1 % — SIGNIFICANT CHANGE UP (ref 0–1.5)
INR BLD: 0.92 RATIO — SIGNIFICANT CHANGE UP (ref 0.88–1.16)
INR BLD: 1.03 RATIO — SIGNIFICANT CHANGE UP (ref 0.88–1.16)
INR BLD: 1.04 RATIO — SIGNIFICANT CHANGE UP (ref 0.88–1.16)
INR BLD: 1.08 RATIO — SIGNIFICANT CHANGE UP (ref 0.88–1.16)
LACTATE SERPL-SCNC: 2 MMOL/L — SIGNIFICANT CHANGE UP (ref 0.5–2)
LACTATE SERPL-SCNC: 3.3 MMOL/L — HIGH (ref 0.5–2)
LDH SERPL L TO P-CCNC: 276 U/L — HIGH (ref 135–225)
LDH SERPL L TO P-CCNC: 278 U/L — HIGH (ref 135–225)
LDH SERPL L TO P-CCNC: 285 U/L — HIGH (ref 135–225)
LYMPHOCYTES # BLD AUTO: 0.52 K/UL — LOW (ref 1–3.3)
LYMPHOCYTES # BLD AUTO: 0.85 K/UL — LOW (ref 1–3.3)
LYMPHOCYTES # BLD AUTO: 0.91 K/UL — LOW (ref 1–3.3)
LYMPHOCYTES # BLD AUTO: 1.34 K/UL — SIGNIFICANT CHANGE UP (ref 1–3.3)
LYMPHOCYTES # BLD AUTO: 10.9 % — LOW (ref 13–44)
LYMPHOCYTES # BLD AUTO: 3 % — LOW (ref 13–44)
LYMPHOCYTES # BLD AUTO: 5.3 % — LOW (ref 13–44)
LYMPHOCYTES # BLD AUTO: 6.1 % — LOW (ref 13–44)
MAGNESIUM SERPL-MCNC: 3.7 MG/DL — HIGH (ref 1.6–2.6)
MAGNESIUM SERPL-MCNC: 3.8 MG/DL — HIGH (ref 1.6–2.6)
MAGNESIUM SERPL-MCNC: 3.8 MG/DL — HIGH (ref 1.6–2.6)
MAGNESIUM SERPL-MCNC: 4.1 MG/DL — HIGH (ref 1.6–2.6)
MCHC RBC-ENTMCNC: 30.5 PG — SIGNIFICANT CHANGE UP (ref 27–34)
MCHC RBC-ENTMCNC: 30.8 PG — SIGNIFICANT CHANGE UP (ref 27–34)
MCHC RBC-ENTMCNC: 31 PG — SIGNIFICANT CHANGE UP (ref 27–34)
MCHC RBC-ENTMCNC: 31.6 PG — SIGNIFICANT CHANGE UP (ref 27–34)
MCHC RBC-ENTMCNC: 33 GM/DL — SIGNIFICANT CHANGE UP (ref 32–36)
MCHC RBC-ENTMCNC: 33.2 GM/DL — SIGNIFICANT CHANGE UP (ref 32–36)
MCHC RBC-ENTMCNC: 34.3 GM/DL — SIGNIFICANT CHANGE UP (ref 32–36)
MCHC RBC-ENTMCNC: 34.8 GM/DL — SIGNIFICANT CHANGE UP (ref 32–36)
MCV RBC AUTO: 89.1 FL — SIGNIFICANT CHANGE UP (ref 80–100)
MCV RBC AUTO: 91.8 FL — SIGNIFICANT CHANGE UP (ref 80–100)
MCV RBC AUTO: 92.1 FL — SIGNIFICANT CHANGE UP (ref 80–100)
MCV RBC AUTO: 93.5 FL — SIGNIFICANT CHANGE UP (ref 80–100)
MONOCYTES # BLD AUTO: 0.27 K/UL — SIGNIFICANT CHANGE UP (ref 0–0.9)
MONOCYTES # BLD AUTO: 0.99 K/UL — HIGH (ref 0–0.9)
MONOCYTES # BLD AUTO: 1.01 K/UL — HIGH (ref 0–0.9)
MONOCYTES # BLD AUTO: 1.06 K/UL — HIGH (ref 0–0.9)
MONOCYTES NFR BLD AUTO: 1.8 % — LOW (ref 2–14)
MONOCYTES NFR BLD AUTO: 6.1 % — SIGNIFICANT CHANGE UP (ref 2–14)
MONOCYTES NFR BLD AUTO: 6.1 % — SIGNIFICANT CHANGE UP (ref 2–14)
MONOCYTES NFR BLD AUTO: 8.2 % — SIGNIFICANT CHANGE UP (ref 2–14)
NEUTROPHILS # BLD AUTO: 13.64 K/UL — HIGH (ref 1.8–7.4)
NEUTROPHILS # BLD AUTO: 14.17 K/UL — HIGH (ref 1.8–7.4)
NEUTROPHILS # BLD AUTO: 15.56 K/UL — HIGH (ref 1.8–7.4)
NEUTROPHILS # BLD AUTO: 9.78 K/UL — HIGH (ref 1.8–7.4)
NEUTROPHILS NFR BLD AUTO: 79.5 % — HIGH (ref 43–77)
NEUTROPHILS NFR BLD AUTO: 87.6 % — HIGH (ref 43–77)
NEUTROPHILS NFR BLD AUTO: 87.7 % — HIGH (ref 43–77)
NEUTROPHILS NFR BLD AUTO: 89.9 % — HIGH (ref 43–77)
NRBC # BLD: 0 /100 WBCS — SIGNIFICANT CHANGE UP
NRBC # FLD: 0 K/UL — SIGNIFICANT CHANGE UP
NRBC # FLD: 0 K/UL — SIGNIFICANT CHANGE UP
NRBC # FLD: 0.02 K/UL — HIGH
PLATELET # BLD AUTO: 104 K/UL — LOW (ref 150–400)
PLATELET # BLD AUTO: 104 K/UL — LOW (ref 150–400)
PLATELET # BLD AUTO: 109 K/UL — LOW (ref 150–400)
PLATELET # BLD AUTO: 96 K/UL — LOW (ref 150–400)
POTASSIUM SERPL-MCNC: 3.6 MMOL/L — SIGNIFICANT CHANGE UP (ref 3.5–5.3)
POTASSIUM SERPL-MCNC: 3.8 MMOL/L — SIGNIFICANT CHANGE UP (ref 3.5–5.3)
POTASSIUM SERPL-MCNC: 4 MMOL/L — SIGNIFICANT CHANGE UP (ref 3.5–5.3)
POTASSIUM SERPL-MCNC: 4.1 MMOL/L — SIGNIFICANT CHANGE UP (ref 3.5–5.3)
POTASSIUM SERPL-SCNC: 3.6 MMOL/L — SIGNIFICANT CHANGE UP (ref 3.5–5.3)
POTASSIUM SERPL-SCNC: 3.8 MMOL/L — SIGNIFICANT CHANGE UP (ref 3.5–5.3)
POTASSIUM SERPL-SCNC: 4 MMOL/L — SIGNIFICANT CHANGE UP (ref 3.5–5.3)
POTASSIUM SERPL-SCNC: 4.1 MMOL/L — SIGNIFICANT CHANGE UP (ref 3.5–5.3)
PROT SERPL-MCNC: 4.1 G/DL — LOW (ref 6–8.3)
PROT SERPL-MCNC: 4.3 G/DL — LOW (ref 6–8.3)
PROT SERPL-MCNC: 4.5 G/DL — LOW (ref 6–8.3)
PROTHROM AB SERPL-ACNC: 10.6 SEC — SIGNIFICANT CHANGE UP (ref 10.6–13.6)
PROTHROM AB SERPL-ACNC: 11.8 SEC — SIGNIFICANT CHANGE UP (ref 10.6–13.6)
PROTHROM AB SERPL-ACNC: 11.8 SEC — SIGNIFICANT CHANGE UP (ref 10.6–13.6)
PROTHROM AB SERPL-ACNC: 12.4 SEC — SIGNIFICANT CHANGE UP (ref 10.6–13.6)
RBC # BLD: 2.66 M/UL — LOW (ref 3.8–5.2)
RBC # BLD: 2.74 M/UL — LOW (ref 3.8–5.2)
RBC # BLD: 2.92 M/UL — LOW (ref 3.8–5.2)
RBC # BLD: 3.05 M/UL — LOW (ref 3.8–5.2)
RBC # FLD: 14.4 % — SIGNIFICANT CHANGE UP (ref 10.3–14.5)
RBC # FLD: 14.9 % — HIGH (ref 10.3–14.5)
RBC # FLD: 15.3 % — HIGH (ref 10.3–14.5)
RBC # FLD: 15.6 % — HIGH (ref 10.3–14.5)
RH IG SCN BLD-IMP: POSITIVE — SIGNIFICANT CHANGE UP
SODIUM SERPL-SCNC: 129 MMOL/L — LOW (ref 135–145)
SODIUM SERPL-SCNC: 130 MMOL/L — LOW (ref 135–145)
SODIUM SERPL-SCNC: 131 MMOL/L — LOW (ref 135–145)
SODIUM SERPL-SCNC: 132 MMOL/L — LOW (ref 135–145)
URATE SERPL-MCNC: 6.2 MG/DL — SIGNIFICANT CHANGE UP (ref 2.5–7)
URATE SERPL-MCNC: 6.6 MG/DL — SIGNIFICANT CHANGE UP (ref 2.5–7)
URATE SERPL-MCNC: 6.7 MG/DL — SIGNIFICANT CHANGE UP (ref 2.5–7)
WBC # BLD: 12.3 K/UL — HIGH (ref 3.8–10.5)
WBC # BLD: 14.96 K/UL — HIGH (ref 3.8–10.5)
WBC # BLD: 16.18 K/UL — HIGH (ref 3.8–10.5)
WBC # BLD: 17.31 K/UL — HIGH (ref 3.8–10.5)
WBC # FLD AUTO: 12.3 K/UL — HIGH (ref 3.8–10.5)
WBC # FLD AUTO: 14.96 K/UL — HIGH (ref 3.8–10.5)
WBC # FLD AUTO: 16.18 K/UL — HIGH (ref 3.8–10.5)
WBC # FLD AUTO: 17.31 K/UL — HIGH (ref 3.8–10.5)

## 2021-02-18 RX ORDER — MAGNESIUM SULFATE 500 MG/ML
1 VIAL (ML) INJECTION
Qty: 40 | Refills: 0 | Status: DISCONTINUED | OUTPATIENT
Start: 2021-02-18 | End: 2021-02-18

## 2021-02-18 RX ORDER — ACETAMINOPHEN 500 MG
975 TABLET ORAL ONCE
Refills: 0 | Status: COMPLETED | OUTPATIENT
Start: 2021-02-18 | End: 2021-02-18

## 2021-02-18 RX ORDER — DIPHENOXYLATE HCL/ATROPINE 2.5-.025MG
1 TABLET ORAL ONCE
Refills: 0 | Status: DISCONTINUED | OUTPATIENT
Start: 2021-02-18 | End: 2021-02-17

## 2021-02-18 RX ORDER — OXYTOCIN 10 UNIT/ML
20 VIAL (ML) INJECTION ONCE
Refills: 0 | Status: COMPLETED | OUTPATIENT
Start: 2021-02-18 | End: 2021-02-17

## 2021-02-18 RX ORDER — IBUPROFEN 200 MG
600 TABLET ORAL EVERY 6 HOURS
Refills: 0 | Status: DISCONTINUED | OUTPATIENT
Start: 2021-02-18 | End: 2021-02-20

## 2021-02-18 RX ORDER — TRANEXAMIC ACID 100 MG/ML
1000 INJECTION, SOLUTION INTRAVENOUS ONCE
Refills: 0 | Status: COMPLETED | OUTPATIENT
Start: 2021-02-18 | End: 2021-02-17

## 2021-02-18 RX ORDER — CARBOPROST TROMETHAMINE 250 UG/ML
250 INJECTION, SOLUTION INTRAMUSCULAR ONCE
Refills: 0 | Status: COMPLETED | OUTPATIENT
Start: 2021-02-18 | End: 2021-02-17

## 2021-02-18 RX ORDER — DIPHENHYDRAMINE HCL 50 MG
25 CAPSULE ORAL ONCE
Refills: 0 | Status: COMPLETED | OUTPATIENT
Start: 2021-02-18 | End: 2021-02-18

## 2021-02-18 RX ORDER — SODIUM CHLORIDE 9 MG/ML
1000 INJECTION, SOLUTION INTRAVENOUS ONCE
Refills: 0 | Status: COMPLETED | OUTPATIENT
Start: 2021-02-18 | End: 2021-02-18

## 2021-02-18 RX ORDER — ERTAPENEM SODIUM 1 G/1
1000 INJECTION, POWDER, LYOPHILIZED, FOR SOLUTION INTRAMUSCULAR; INTRAVENOUS EVERY 24 HOURS
Refills: 0 | Status: DISCONTINUED | OUTPATIENT
Start: 2021-02-18 | End: 2021-02-19

## 2021-02-18 RX ORDER — SODIUM CHLORIDE 9 MG/ML
1000 INJECTION, SOLUTION INTRAVENOUS
Refills: 0 | Status: DISCONTINUED | OUTPATIENT
Start: 2021-02-18 | End: 2021-02-19

## 2021-02-18 RX ORDER — SODIUM CHLORIDE 9 MG/ML
500 INJECTION, SOLUTION INTRAVENOUS ONCE
Refills: 0 | Status: COMPLETED | OUTPATIENT
Start: 2021-02-18 | End: 2021-02-18

## 2021-02-18 RX ADMIN — Medication 975 MILLIGRAM(S): at 10:51

## 2021-02-18 RX ADMIN — Medication 975 MILLIGRAM(S): at 00:15

## 2021-02-18 RX ADMIN — SODIUM CHLORIDE 75 MILLILITER(S): 9 INJECTION, SOLUTION INTRAVENOUS at 10:13

## 2021-02-18 RX ADMIN — Medication 975 MILLIGRAM(S): at 23:30

## 2021-02-18 RX ADMIN — Medication 25 GM/HR: at 20:29

## 2021-02-18 RX ADMIN — SODIUM CHLORIDE 1000 MILLILITER(S): 9 INJECTION, SOLUTION INTRAVENOUS at 01:00

## 2021-02-18 RX ADMIN — SODIUM CHLORIDE 3 MILLILITER(S): 9 INJECTION INTRAMUSCULAR; INTRAVENOUS; SUBCUTANEOUS at 07:10

## 2021-02-18 RX ADMIN — Medication 25 MILLIGRAM(S): at 12:36

## 2021-02-18 RX ADMIN — SODIUM CHLORIDE 1000 MILLILITER(S): 9 INJECTION, SOLUTION INTRAVENOUS at 00:00

## 2021-02-18 RX ADMIN — SODIUM CHLORIDE 3 MILLILITER(S): 9 INJECTION INTRAMUSCULAR; INTRAVENOUS; SUBCUTANEOUS at 13:00

## 2021-02-18 RX ADMIN — Medication 600 MILLIGRAM(S): at 13:22

## 2021-02-18 RX ADMIN — ERTAPENEM SODIUM 120 MILLIGRAM(S): 1 INJECTION, POWDER, LYOPHILIZED, FOR SOLUTION INTRAMUSCULAR; INTRAVENOUS at 00:00

## 2021-02-18 RX ADMIN — SODIUM CHLORIDE 75 MILLILITER(S): 9 INJECTION, SOLUTION INTRAVENOUS at 19:37

## 2021-02-18 RX ADMIN — Medication 25 GM/HR: at 07:10

## 2021-02-18 RX ADMIN — SODIUM CHLORIDE 3 MILLILITER(S): 9 INJECTION INTRAMUSCULAR; INTRAVENOUS; SUBCUTANEOUS at 01:11

## 2021-02-18 RX ADMIN — SODIUM CHLORIDE 500 MILLILITER(S): 9 INJECTION, SOLUTION INTRAVENOUS at 13:22

## 2021-02-18 RX ADMIN — Medication 25 GM/HR: at 02:45

## 2021-02-18 RX ADMIN — Medication 25 GM/HR: at 22:26

## 2021-02-18 RX ADMIN — Medication 75 MILLIUNIT(S)/MIN: at 02:46

## 2021-02-18 RX ADMIN — ERTAPENEM SODIUM 120 MILLIGRAM(S): 1 INJECTION, POWDER, LYOPHILIZED, FOR SOLUTION INTRAMUSCULAR; INTRAVENOUS at 23:35

## 2021-02-18 NOTE — OB PROVIDER DELIVERY SUMMARY - NSPROVIDERDELIVERYNOTE_OBGYN_ALL_OB_FT
Attending Note     Pt progressed to 10/100/+2   Pushed vertex over intact perineum followed by the body and shoulders  Placenta delivered spontaneously   Uterine atony noted  Resolved with TXA, hemabate, cytotec, double pitocin and laguna catheter in place  Vagina, rectum and cervix inspected and 2nd degree laceration repaired in usual fashion   Placenta cultures obtained   Rectal exam intact after delivery   Total EBL 1500   Pt to get invanz in the LDR   Will send stat labs, IV bolus,  and monitor on L & D       R Leena GOMEZ Attending Note     Pt progressed to 10/100/+2   Pushed vertex over intact perineum followed by the body and shoulders  Placenta delivered spontaneously   Uterine atony noted  Resolved with TXA, hemabate, cytotec, double pitocin and laguna catheter placed  Vagina, rectum and cervix inspected and 2nd degree laceration repaired in usual fashion   Placenta cultures obtained   Rectal exam intact after delivery   Total EBL 1500   Pt to get invanz in the LDR   Will send stat labs, IV bolus,  and monitor on L & D       R Leena GOMEZ

## 2021-02-18 NOTE — CHART NOTE - NSCHARTNOTEFT_GEN_A_CORE
R2 Chart Note    Patient seen and examined at bedside due to temperature of 38.9 and  noted on routine vitals.  Upon examination patient reports feeling better than she did this morning.  Does not feel like she is having a fever and has no chills.  No acute complaints at this time. Denies CP, SOB, N/V, fevers, chills, or any other concerns.    Vital Signs Last 24 Hours  T(C): 38.3 (21 @ 00:06), Max: 38.3 (21 @ 00:06)  HR: 118 (21 @ 10:58) (90 - 132)  BP: 133/77 (21 @ 11:06) (91/51 - 149/91)  RR: --  SpO2: 98% (21 @ 10:58) (83% - 100%)    I&O's Summary    2021 07:01  -  2021 07:00  --------------------------------------------------------  IN: 5350 mL / OUT: 4490 mL / NET: 860 mL    2021 07:01  -  2021 11:12  --------------------------------------------------------  IN: 400 mL / OUT: 159 mL / NET: 241 mL        Physical Exam:  General: NAD  CV: RR  Lungs: breathing comfortably on RA, CTAb  Abdomen: soft, non distended, nontender, no fundal tenderness  : minimal bleeding on pad  Ext: no pain or swelling      Labs:             9.0<L>  16.18<H> )-----------( 104<L>    (  @ 07:26 )             27.3<L>               8.4<L>  17.31<H> )-----------( 109<L>    ( 02-18 @ 02:11 )             24.5<L>               12.3   10.33 )-----------( 109<L>    (  @ 15:26 )             36.4                13.1   9.60  )-----------( 112<L>    (  @ 09:02 )             38.3                12.6   9.09  )-----------( 125<L>    (  @ 03:48 )             38.9         MEDICATIONS  (STANDING):  acetaminophen   Tablet .. 975 milliGRAM(s) Oral <User Schedule>  diphtheria/tetanus/pertussis (acellular) Vaccine (ADAcel) 0.5 milliLiter(s) IntraMuscular once  ertapenem  IVPB 1000 milliGRAM(s) IV Intermittent every 24 hours  ibuprofen  Tablet. 600 milliGRAM(s) Oral every 6 hours  ketorolac   Injectable 30 milliGRAM(s) IV Push once  lactated ringers. 1000 milliLiter(s) (75 mL/Hr) IV Continuous <Continuous>  magnesium sulfate Infusion 1 Gm/Hr (25 mL/Hr) IV Continuous <Continuous>  misoprostol 25 MICROGram(s) Vaginal every 3 hours  ondansetron Injectable 4 milliGRAM(s) IV Push once  oxytocin Infusion 25 milliUNIT(s)/Min (75 mL/Hr) IV Continuous <Continuous>  prenatal multivitamin 1 Tablet(s) Oral daily  sodium chloride 0.9% lock flush 3 milliLiter(s) IV Push every 8 hours    MEDICATIONS  (PRN):  benzocaine 20%/menthol 0.5% Spray 1 Spray(s) Topical every 6 hours PRN for Perineal discomfort  dibucaine 1% Ointment 1 Application(s) Topical every 6 hours PRN Perineal discomfort  diphenhydrAMINE 25 milliGRAM(s) Oral every 6 hours PRN Pruritus  hydrocortisone 1% Cream 1 Application(s) Topical every 6 hours PRN Moderate Pain (4-6)  lanolin Ointment 1 Application(s) Topical every 6 hours PRN nipple soreness  magnesium hydroxide Suspension 30 milliLiter(s) Oral two times a day PRN Constipation  oxyCODONE    IR 5 milliGRAM(s) Oral every 3 hours PRN Moderate to Severe Pain (4-10)  oxyCODONE    IR 5 milliGRAM(s) Oral once PRN Moderate to Severe Pain (4-10)  pramoxine 1%/zinc 5% Cream 1 Application(s) Topical every 4 hours PRN Moderate Pain (4-6)  simethicone 80 milliGRAM(s) Chew every 4 hours PRN Gas  sodium chloride 0.65% Nasal 1 Spray(s) Both Nostrils every 4 hours PRN Nasal Congestion  witch hazel Pads 1 Application(s) Topical every 4 hours PRN Perineal discomfort        A/P: 32yo sPEC/Mg PPD#1 s/p , intrapartum course c/b chorioamnionitis s/p Amp/Gent, postpartum course c/b PPH total EBL 1500 now s/p 1uPRBC w/ Hct 24.5->27.3 after transfusion and receiving Invanz for uterine manipulation w/ WBC 16.8. Patient with temperature of 38.9 at 10:45am and .  Patient with no acute complaints.  -blood cultures x2 drawn  -urine culture  -tylenol  -c/w Invanz  -full set of [facility] labs to be sent at 12:30p    d/w Dr. Sabiha Dominguez, PGY2 R2 Chart Note    Patient seen and examined at bedside due to temperature of 38.9 and  noted on routine vitals.  Upon examination patient reports feeling better than she did this morning when she was endorsing dizziness and lightheadedness.  Does not feel like she is having a fever and has no chills.  No acute complaints at this time. Denies CP, SOB, N/V, fevers, chills, or any other concerns.    Vital Signs Last 24 Hours  T(C): 38.3 (21 @ 00:06), Max: 38.3 (21 @ 00:06)  HR: 118 (21 @ 10:58) (90 - 132)  BP: 133/77 (21 @ 11:06) (91/51 - 149/91)  RR: --  SpO2: 98% (21 @ 10:58) (83% - 100%)    I&O's Summary    2021 07:01  -  2021 07:00  --------------------------------------------------------  IN: 5350 mL / OUT: 4490 mL / NET: 860 mL    2021 07:01  -  2021 11:12  --------------------------------------------------------  IN: 400 mL / OUT: 159 mL / NET: 241 mL        Physical Exam:  General: NAD  CV: RR  Lungs: breathing comfortably on RA, CTAb  Abdomen: soft, non distended, nontender, no fundal tenderness  : minimal bleeding on pad  Ext: no pain or swelling      Labs:             9.0<L>  16.18<H> )-----------( 104<L>    (  @ 07:26 )             27.3<L>               8.4<L>  17.31<H> )-----------( 109<L>    (  @ 02:11 )             24.5<L>               12.3   10.33 )-----------( 109<L>    (  @ 15:26 )             36.4                13.1   9.60  )-----------( 112<L>    (  @ 09:02 )             38.3                12.6   9.09  )-----------( 125<L>    (  @ 03:48 )             38.9         MEDICATIONS  (STANDING):  acetaminophen   Tablet .. 975 milliGRAM(s) Oral <User Schedule>  diphtheria/tetanus/pertussis (acellular) Vaccine (ADAcel) 0.5 milliLiter(s) IntraMuscular once  ertapenem  IVPB 1000 milliGRAM(s) IV Intermittent every 24 hours  ibuprofen  Tablet. 600 milliGRAM(s) Oral every 6 hours  ketorolac   Injectable 30 milliGRAM(s) IV Push once  lactated ringers. 1000 milliLiter(s) (75 mL/Hr) IV Continuous <Continuous>  magnesium sulfate Infusion 1 Gm/Hr (25 mL/Hr) IV Continuous <Continuous>  misoprostol 25 MICROGram(s) Vaginal every 3 hours  ondansetron Injectable 4 milliGRAM(s) IV Push once  oxytocin Infusion 25 milliUNIT(s)/Min (75 mL/Hr) IV Continuous <Continuous>  prenatal multivitamin 1 Tablet(s) Oral daily  sodium chloride 0.9% lock flush 3 milliLiter(s) IV Push every 8 hours    MEDICATIONS  (PRN):  benzocaine 20%/menthol 0.5% Spray 1 Spray(s) Topical every 6 hours PRN for Perineal discomfort  dibucaine 1% Ointment 1 Application(s) Topical every 6 hours PRN Perineal discomfort  diphenhydrAMINE 25 milliGRAM(s) Oral every 6 hours PRN Pruritus  hydrocortisone 1% Cream 1 Application(s) Topical every 6 hours PRN Moderate Pain (4-6)  lanolin Ointment 1 Application(s) Topical every 6 hours PRN nipple soreness  magnesium hydroxide Suspension 30 milliLiter(s) Oral two times a day PRN Constipation  oxyCODONE    IR 5 milliGRAM(s) Oral every 3 hours PRN Moderate to Severe Pain (4-10)  oxyCODONE    IR 5 milliGRAM(s) Oral once PRN Moderate to Severe Pain (4-10)  pramoxine 1%/zinc 5% Cream 1 Application(s) Topical every 4 hours PRN Moderate Pain (4-6)  simethicone 80 milliGRAM(s) Chew every 4 hours PRN Gas  sodium chloride 0.65% Nasal 1 Spray(s) Both Nostrils every 4 hours PRN Nasal Congestion  witch hazel Pads 1 Application(s) Topical every 4 hours PRN Perineal discomfort        A/P: 32yo sPEC/Mg PPD#1 s/p , intrapartum course c/b chorioamnionitis s/p Amp/Gent, postpartum course c/b PPH total EBL 1500 now s/p 1uPRBC w/ Hct 24.5->27.3 after transfusion and receiving Invanz for uterine manipulation w/ WBC 16.8. Patient with temperature of 38.9 at 10:45am and .  Patient with no acute complaints.  -blood cultures x2 drawn  -urine culture  -tylenol  -c/w Invanz  -transfuse 1uPRBC  -full set of HELLP labs to be sent with follow up CBC post-tx    d/w Dr. Sabiha Dominguez, PGY2

## 2021-02-18 NOTE — OB RN DELIVERY SUMMARY - NS_RNDELIVATTEST_OBGYN_ALL_OB
[FreeTextEntry1] : cjr\par \par c/w aspirin 81 mg bid\par start percocet as ordered by Dr Xochitl Duenas\par \par c/w with physical therapy\par follow up with all medical appointments. \par \par htn\par asa 81 mg daily\par diltiazem 240 mg daily\par lisinopril 20 mg day\par propranolol 20 mg daily\par  OB Provider reported that the vagina was inspected and no foreign body was found/Laps, needles and instrument count was correct

## 2021-02-18 NOTE — PROVIDER CONTACT NOTE (OTHER) - ASSESSMENT
Febrile 38.7 orally  Tachycardia 120s  Fundus firm @umbilicus, light bleeding  denies SOB, pain, HA or lightheaded/dizziness  UO adequate 100ml, elías  Magnesium infusing 25mL/hr, LR infusing 75 mL/hr  0630 - H/H 9.0/27.3, Creatinine 2.0, Magnesium level 4.1, Platelets 104, WBC 16.18

## 2021-02-18 NOTE — CHART NOTE - NSCHARTNOTEFT_GEN_A_CORE
R3 Chart Note     Patient evaluated at bedside due to tachycardia to 120s postpartum. Patient PPD#0 s/p  c/b PPH due to atony with total EBL 1500. Patient given TXA, hemabate, cytotec, double pitocin. STAT labs clotted, repeat labs pending. Patient s/p 2L LR bolus and reports feeling overall better. She reports mild dizziness, but denies CP, SOB, N/V, HA, changes in vision. Pain well controlled.     Vital Signs Last 24 Hrs  T(C): 38.3 (2021 00:06), Max: 38.3 (2021 00:06)  T(F): 100.9 (2021 00:06), Max: 100.9 (2021 00:06)  HR: 122 (2021 01:58) (100 - 132)  BP: 109/63 (2021 01:51) (102/62 - 149/91)  BP(mean): --  RR: --  SpO2: 100% (2021 01:58) (93% - 100%)    Physical Exam:   General: sitting comfortably in bed, NAD, AOx3   CV: Tachycardic, S1 S2  Lungs: CTA b/l, good air flow b/l   Abd: soft, NTND, no rebound or guarding, fundus firm, midline   : scant blood on pad, no active bleeding   Ext: NT b/l, no edema    32 yo  s/p  c/b chorio, sPEC/Mg, PPH with total EBL 1500. Patient s/p 2L LR bolus, with persistent tachycardia to 120s. Patient clinically stable at this time with no active vaginal bleeding and fundus firm, midline.   - Will transfuse 1upRBC now, consider 2nd unit pRBC after STAT labs if severe anemia   - F/u STAT labs , drawn pre-transfusion   - Continue Invanz    - F/u placenta cx    - Continue to monitor patient on L&D     E Demirel PGY3  Pt evaluated w/Dr. Warren Choe at bedside R3 Chart Note     Patient evaluated at bedside due to tachycardia to 120s postpartum. Patient PPD#0 s/p  c/b PPH due to atony with total EBL 1500. Patient given TXA, hemabate, cytotec, double pitocin. STAT labs clotted, repeat labs pending. Patient s/p 2L LR bolus and reports feeling overall better. She reports mild dizziness, but denies CP, SOB, N/V, HA, changes in vision. Pain well controlled.     Vital Signs Last 24 Hrs  T(C): 38.3 (2021 00:06), Max: 38.3 (2021 00:06)  T(F): 100.9 (2021 00:06), Max: 100.9 (2021 00:06)  HR: 122 (2021 01:58) (100 - 132)  BP: 109/63 (2021 01:51) (102/62 - 149/91)  BP(mean): --  RR: --  SpO2: 100% (2021 01:58) (93% - 100%)    Physical Exam:   General: sitting comfortably in bed, NAD, AOx3   CV: Tachycardic, S1 S2  Lungs: CTA b/l, good air flow b/l   Abd: soft, NTND, no rebound or guarding, fundus firm, midline   : scant blood on pad, no active bleeding   Ext: NT b/l, no edema    32 yo  s/p  c/b chorio, sPEC/Mg, PPH with total EBL 1500. Patient s/p 2L LR bolus, with persistent tachycardia to 120s. Patient clinically stable at this time with no active vaginal bleeding and fundus firm, midline.   - Will transfuse 1upRBC now, consider 2nd unit pRBC after STAT labs if severe anemia   - F/u STAT labs , drawn pre-transfusion   - Continue Invanz    - F/u placenta cx    - Continue to monitor patient on L&D     E Demirel PGY3  Pt evaluated w/Dr. Warren Choe at bedside    Attending Note     Pt seen and examined   Pt is feeling dizzy   no Ha/vision changes. RUQ or epigastric pain   Abd soft, fundus firm nontender  No blood on pad   Transfuse pRBCS now   continue invanz  Continue mg HANNAH Stern MD

## 2021-02-18 NOTE — OB POSTPARTUM EVENT NOTE - NS_EVENTFINDINGS1_OBGYN_ALL_OB_FT
Pt bleeding is to be monitored. Pt is to have HELLP and lactate lab work sent to see if a blood transfusion is needed. Pt is to receive at 1L bolus.

## 2021-02-18 NOTE — PROVIDER CONTACT NOTE (OTHER) - SITUATION
@38.6 s/p  @2309; EBL 1500; Tachycardia 120s, febrile 38.7 orally
pt mg level 3.8 not within therapeutic range

## 2021-02-18 NOTE — PROGRESS NOTE ADULT - PROBLEM SELECTOR PLAN 3
- Afebrile since 2/17 @1822  - Continue to monitor for clinical signs and symptoms of infection  - Continue Invanz for 2 doses 2/2 uterine manipulation at time of delivery    Diane Rich MD PGY1

## 2021-02-18 NOTE — OB RN DELIVERY SUMMARY - AS DELIV COMPLICATIONS OB
abnormal fetal heart rate tracing/chorioamnionitis/maternal fever/peripartum hemorrhage/pre eclampsia

## 2021-02-18 NOTE — PROVIDER CONTACT NOTE (OTHER) - ACTION/TREATMENT ORDERED:
Mg to be d/c at 23:09
Tylenol 975mg PO to be given  Blood cultures x2, urine culture to be drawn  MD Dominguez at bedside for evaluation  Repeat HELLP/Mg labs@1231

## 2021-02-18 NOTE — CHART NOTE - NSCHARTNOTEFT_GEN_A_CORE
R1 OB Event Note    Patient evaluated at bedside due to postpartum temperature on PPD#1. Patient reports no complaints. She had some chills earlier in the day but reports they have resolved. Denies HA, chills/sweats, CP/SOB, n/v, cough, abdominal pain, back pain, lower extremity pain/swelling.     Vital Signs Last 24 Hrs  T(C): 38.7 (2021 23:00), Max: 39.3 (2021 13:02)  T(F): 101.6 (2021 23:00), Max: 102.7 (2021 13:02)  HR: 98 (2021 23:00) (83 - 132)  BP: 119/69 (2021 23:00) (91/51 - 149/91)  BP(mean): --  RR: --  SpO2: 96% (2021 23:00) (83% - 100%)    I&O's Detail    2021 07:01  -  2021 07:00  --------------------------------------------------------  IN:    Lactated Ringers: 75 mL    Lactated Ringers: 800 mL    Lactated Ringers Bolus: 2000 mL    Magnesium Sulfate: 125 mL    Magnesium Sulfate: 550 mL    Magnesium Sulfate: 250 mL    Oxytocin: 1000 mL    Oxytocin: 125 mL    Oxytocin: 425 mL  Total IN: 5350 mL    OUT:    Estimated Blood Loss (mL): 1500 mL    Indwelling Catheter - Urethral (mL): 1440 mL    Voided (mL): 1550 mL  Total OUT: 4490 mL  Total NET: 860 mL      2021 07:01  -  2021 23:40  --------------------------------------------------------  IN:    Lactated Ringers: 900 mL    Lactated Ringers Bolus: 500 mL    Magnesium Sulfate: 325 mL    PRBCs (Packed Red Blood Cells): 300 mL  Total IN: 2025 mL    OUT:    Indwelling Catheter - Urethral (mL): 1532 mL  Total OUT: 1532 mL  Total NET: 493 mL      I&O's Summary    2021 07:  -  2021 07:00  --------------------------------------------------------  IN: 5350 mL / OUT: 4490 mL / NET: 860 mL    2021 07:01  -  2021 23:40  --------------------------------------------------------  IN:  mL / OUT: 1532 mL / NET: 493 mL        Physical Exam:   General: lying comftorably in bed, NAD        CV: RR S1S2   Lungs: CTA b/l, good air flow b/l  Abdomen: fundus firm, non tender. abdomen non-tender, non-distended.  bowel sounds present.    Vaginal: lochia wnl   Extremities: non-tender b/l, no edema.                 9.3    12.30 )-----------( 96       (  @ 23:22 )             28.0                8.5    14.96 )-----------( 104      (  @ 12:47 )             24.4                9.0    16.18 )-----------( 104      (  @ 07:26 )             27.3                8.4    17.31 )-----------( 109      (  @ 02:11 )             24.5                12.3   10.33 )-----------( 109      (  @ 15:26 )             36.4                13.1   9.60  )-----------( 112      (  @ 09:02 )             38.3                12.6   9.09  )-----------( 125      (  @ 03:48 )             38.9                13.4   10.15 )-----------( 127      (  @ 21:28 )             40.4                12.9   8.50  )-----------( 135      (  @ 15:50 )             38.8                12.7   7.59  )-----------( 126      (  @ 09:49 )             37.6                13.0   8.57  )-----------( 138      (  @ 05:00 )             39.8         A/P 30y/o PPD#1 s/p  c/b sPEC now s/p Mg ppx, EBL of 1500cc's s/p 2uPRBCs, chorioamnionitis s/p ampicillin/gentamicin, and tylenol and endometritis currently on Invanz w/ post-partum temperature of 38.7. Differential includes endometritis vs. vs. breast engorgment vs. UTI vs. delayed transfusion reaction. Patient doing well with no signs of sepsis at this time.   -Lactate downtrended to 2.0 from 3.3  -Tylenol for fever  -Due for next dose of Invanz  -CBC w/ diff/HELLP labs pending  -Bcx, Ucx, and placental cultures already pending  -Continue to monitor VS     D/W Dr. Tristin Hu, PGY1

## 2021-02-18 NOTE — PROGRESS NOTE ADULT - PROBLEM SELECTOR PLAN 2
- UOP 1440cc overnight, encouraged to continue PO hydration  - f/u 630am HELLP labs and repeat lactate  - BPs well controlled without antihypertensive meds  - Continue to monitor VS and strict I&Os  - d/c Mg and laguna at 11pm tonight

## 2021-02-18 NOTE — OB POSTPARTUM EVENT NOTE - NS_EVENTSUMMARY1_OBGYN_ALL_OB_FT
Pt delivered vaginally at 2309. Total EBL was 1500 with a second degree laceration. Pt was on magnesium sulfate that was paused for delivery, had an antepartum maternal fever, chorio, and has PEC. Hernandez catheter placed by MD Henderson to monitor strict I+O's. Hemorrhage was resolved with the following medications  Hemabate @ 2315  Rectal cytotec @ 2315  TXA @ 2345  20 units of additional pit in the 20u bag bolus @ 2315

## 2021-02-18 NOTE — OB RN DELIVERY SUMMARY - NS_SEPSISRSKCALC_OBGYN_ALL_OB_FT
EOS calculated successfully. EOS Risk Factor: 0.5/1000 live births ([facility] national incidence); GA=38w6d; Temp=100.4; ROM=7.15; GBS='Negative'; Antibiotics='Broad spectrum antibiotics 2-3.9 hrs prior to birth'

## 2021-02-18 NOTE — PROVIDER CONTACT NOTE (OTHER) - BACKGROUND
@38.6 sPEC/Mg s/p  @2309; EBL 1500; s/p TXA, Hemabate/Lomotil, rectal Cytotec, Pitocin 40unit bolus, 2L LR bolus; s/p 1unit PRBCs; s/p Invanz x1; laguna catheter in situ

## 2021-02-18 NOTE — OB RN DELIVERY SUMMARY - NS_SEROLOGYDONE_OBGYN_ALL_OB
[address]   Cardiac follow up     Referring Provider:  Kyle Bills MD         HPI:  Kayce Felipe is a 66 y.o. male who was referred by Dr. Yuni Lucero for evaluation SCA risk. He has CAD and underwent CABS on 9/16/16 for 3 vessel CAD. MRI in 2016 showed an EF of 23%. His Echo on 6/26/17 showed an EF 33%. He wore a cardiac event monitor from 6/29-7/12/17, average heart rate was 79 (), NSVT. He currently feels well. He denies chest pain, palpitations, dizziness or syncope. He is not very active, but denies SOB or excessive fatigue. His daughter states he does not complain and will not tell if he has chest pain. He states he has dizziness when he stands up fast or rolls over in bed fast. He has had a sleep study and will be getting a CPAP machine. His EKG today shows sinus rhythm with LBBB. MUGA scan on 9/28/17 showed an EF of 20%. He denies dizziness or syncope. He underwent BiV ICD placement 11/2/2017. His device check from today shows normal device function with no issues. He feels he has recovered well from his procedure. He has been taking his medications as prescribed. He attempts to remain as active as possible. He has had a decreased appetite and lost 5 pounds in the last month. He attributes this to his dental issues. He denies chest pain, palpitations, syncope, dizziness, shortness of breath or edema. Past Medical History:   has a past medical history of Abnormal echocardiogram; Abnormal stress test; Cardiomyopathy ([address] CHF (congestive heart failure) ([facility] [address] Diabetes ([facility] [address] Hyperlipidemia; and Hypertension. Surgical History:   has a past surgical history that includes Neck surgery; Colonoscopy (07/16/2016); Coronary artery bypass graft (09/16/2016); and Upper gastrointestinal endoscopy (05/22/2017). Social History:   reports that he has never smoked. He has never used smokeless tobacco. He reports that he does not drink alcohol or use drugs.      Family
Yes

## 2021-02-19 ENCOUNTER — TRANSCRIPTION ENCOUNTER (OUTPATIENT)
Age: 31
End: 2021-02-19

## 2021-02-19 DIAGNOSIS — R21 RASH AND OTHER NONSPECIFIC SKIN ERUPTION: ICD-10-CM

## 2021-02-19 DIAGNOSIS — D62 ACUTE POSTHEMORRHAGIC ANEMIA: ICD-10-CM

## 2021-02-19 DIAGNOSIS — N71.9 INFLAMMATORY DISEASE OF UTERUS, UNSPECIFIED: ICD-10-CM

## 2021-02-19 LAB
ALBUMIN SERPL ELPH-MCNC: 2.3 G/DL — LOW (ref 3.3–5)
ALBUMIN SERPL ELPH-MCNC: 2.8 G/DL — LOW (ref 3.3–5)
ALP SERPL-CCNC: 131 U/L — HIGH (ref 40–120)
ALP SERPL-CCNC: 136 U/L — HIGH (ref 40–120)
ALT FLD-CCNC: 13 U/L — SIGNIFICANT CHANGE UP (ref 4–33)
ALT FLD-CCNC: 16 U/L — SIGNIFICANT CHANGE UP (ref 4–33)
ANION GAP SERPL CALC-SCNC: 10 MMOL/L — SIGNIFICANT CHANGE UP (ref 7–14)
ANION GAP SERPL CALC-SCNC: 9 MMOL/L — SIGNIFICANT CHANGE UP (ref 7–14)
APTT BLD: 21 SEC — LOW (ref 27–36.3)
AST SERPL-CCNC: 25 U/L — SIGNIFICANT CHANGE UP (ref 4–32)
AST SERPL-CCNC: 29 U/L — SIGNIFICANT CHANGE UP (ref 4–32)
BASOPHILS # BLD AUTO: 0.03 K/UL — SIGNIFICANT CHANGE UP (ref 0–0.2)
BASOPHILS NFR BLD AUTO: 0.2 % — SIGNIFICANT CHANGE UP (ref 0–2)
BILIRUB SERPL-MCNC: 0.2 MG/DL — SIGNIFICANT CHANGE UP (ref 0.2–1.2)
BILIRUB SERPL-MCNC: 0.3 MG/DL — SIGNIFICANT CHANGE UP (ref 0.2–1.2)
BUN SERPL-MCNC: 7 MG/DL — SIGNIFICANT CHANGE UP (ref 7–23)
BUN SERPL-MCNC: 9 MG/DL — SIGNIFICANT CHANGE UP (ref 7–23)
CALCIUM SERPL-MCNC: 7.1 MG/DL — LOW (ref 8.4–10.5)
CALCIUM SERPL-MCNC: 7.4 MG/DL — LOW (ref 8.4–10.5)
CHLORIDE SERPL-SCNC: 101 MMOL/L — SIGNIFICANT CHANGE UP (ref 98–107)
CO2 SERPL-SCNC: 23 MMOL/L — SIGNIFICANT CHANGE UP (ref 22–31)
CO2 SERPL-SCNC: 23 MMOL/L — SIGNIFICANT CHANGE UP (ref 22–31)
CREAT SERPL-MCNC: 0.64 MG/DL — SIGNIFICANT CHANGE UP (ref 0.5–1.3)
CREAT SERPL-MCNC: 0.77 MG/DL — SIGNIFICANT CHANGE UP (ref 0.5–1.3)
CULTURE RESULTS: SIGNIFICANT CHANGE UP
EOSINOPHIL # BLD AUTO: 0.03 K/UL — SIGNIFICANT CHANGE UP (ref 0–0.5)
EOSINOPHIL NFR BLD AUTO: 0.2 % — SIGNIFICANT CHANGE UP (ref 0–6)
FIBRINOGEN PPP-MCNC: 695 MG/DL — HIGH (ref 290–520)
GLUCOSE SERPL-MCNC: 75 MG/DL — SIGNIFICANT CHANGE UP (ref 70–99)
GLUCOSE SERPL-MCNC: 82 MG/DL — SIGNIFICANT CHANGE UP (ref 70–99)
HCT VFR BLD CALC: 26.2 % — LOW (ref 34.5–45)
HGB BLD-MCNC: 8.8 G/DL — LOW (ref 11.5–15.5)
IANC: 9.26 K/UL — HIGH (ref 1.5–8.5)
IMM GRANULOCYTES NFR BLD AUTO: 1.4 % — SIGNIFICANT CHANGE UP (ref 0–1.5)
INR BLD: 0.93 RATIO — SIGNIFICANT CHANGE UP (ref 0.88–1.16)
LDH SERPL L TO P-CCNC: 284 U/L — HIGH (ref 135–225)
LYMPHOCYTES # BLD AUTO: 1.52 K/UL — SIGNIFICANT CHANGE UP (ref 1–3.3)
LYMPHOCYTES # BLD AUTO: 12.6 % — LOW (ref 13–44)
MCHC RBC-ENTMCNC: 30.6 PG — SIGNIFICANT CHANGE UP (ref 27–34)
MCHC RBC-ENTMCNC: 33.6 GM/DL — SIGNIFICANT CHANGE UP (ref 32–36)
MCV RBC AUTO: 91 FL — SIGNIFICANT CHANGE UP (ref 80–100)
MONOCYTES # BLD AUTO: 1.05 K/UL — HIGH (ref 0–0.9)
MONOCYTES NFR BLD AUTO: 8.7 % — SIGNIFICANT CHANGE UP (ref 2–14)
NEUTROPHILS # BLD AUTO: 9.26 K/UL — HIGH (ref 1.8–7.4)
NEUTROPHILS NFR BLD AUTO: 76.9 % — SIGNIFICANT CHANGE UP (ref 43–77)
NRBC # BLD: 0 /100 WBCS — SIGNIFICANT CHANGE UP
NRBC # FLD: 0 K/UL — SIGNIFICANT CHANGE UP
PLATELET # BLD AUTO: 91 K/UL — LOW (ref 150–400)
POTASSIUM SERPL-MCNC: 3.9 MMOL/L — SIGNIFICANT CHANGE UP (ref 3.5–5.3)
POTASSIUM SERPL-SCNC: 3.9 MMOL/L — SIGNIFICANT CHANGE UP (ref 3.5–5.3)
PROT SERPL-MCNC: 4.6 G/DL — LOW (ref 6–8.3)
PROT SERPL-MCNC: 5.3 G/DL — LOW (ref 6–8.3)
PROTHROM AB SERPL-ACNC: 10.6 SEC — SIGNIFICANT CHANGE UP (ref 10.6–13.6)
RBC # BLD: 2.88 M/UL — LOW (ref 3.8–5.2)
RBC # FLD: 15.7 % — HIGH (ref 10.3–14.5)
SODIUM SERPL-SCNC: 134 MMOL/L — LOW (ref 135–145)
SPECIMEN SOURCE: SIGNIFICANT CHANGE UP
URATE SERPL-MCNC: 6.3 MG/DL — SIGNIFICANT CHANGE UP (ref 2.5–7)
WBC # BLD: 12.06 K/UL — HIGH (ref 3.8–10.5)
WBC # FLD AUTO: 12.06 K/UL — HIGH (ref 3.8–10.5)

## 2021-02-19 RX ORDER — DIPHENHYDRAMINE HCL 50 MG
50 CAPSULE ORAL EVERY 6 HOURS
Refills: 0 | Status: DISCONTINUED | OUTPATIENT
Start: 2021-02-19 | End: 2021-02-20

## 2021-02-19 RX ORDER — ACETAMINOPHEN 500 MG
3 TABLET ORAL
Qty: 0 | Refills: 0 | DISCHARGE
Start: 2021-02-19

## 2021-02-19 RX ORDER — IBUPROFEN 200 MG
1 TABLET ORAL
Qty: 0 | Refills: 0 | DISCHARGE
Start: 2021-02-19

## 2021-02-19 RX ORDER — HYDROCORTISONE 1 %
1 OINTMENT (GRAM) TOPICAL THREE TIMES A DAY
Refills: 0 | Status: DISCONTINUED | OUTPATIENT
Start: 2021-02-19 | End: 2021-02-20

## 2021-02-19 RX ORDER — DIPHENHYDRAMINE HCL 50 MG
25 CAPSULE ORAL EVERY 6 HOURS
Refills: 0 | Status: DISCONTINUED | OUTPATIENT
Start: 2021-02-19 | End: 2021-02-19

## 2021-02-19 RX ADMIN — Medication 975 MILLIGRAM(S): at 05:29

## 2021-02-19 RX ADMIN — Medication 975 MILLIGRAM(S): at 09:50

## 2021-02-19 RX ADMIN — Medication 1 APPLICATION(S): at 10:21

## 2021-02-19 RX ADMIN — Medication 975 MILLIGRAM(S): at 16:27

## 2021-02-19 RX ADMIN — Medication 25 MILLIGRAM(S): at 01:29

## 2021-02-19 RX ADMIN — SODIUM CHLORIDE 3 MILLILITER(S): 9 INJECTION INTRAMUSCULAR; INTRAVENOUS; SUBCUTANEOUS at 05:33

## 2021-02-19 RX ADMIN — SODIUM CHLORIDE 3 MILLILITER(S): 9 INJECTION INTRAMUSCULAR; INTRAVENOUS; SUBCUTANEOUS at 22:30

## 2021-02-19 RX ADMIN — Medication 50 MILLIGRAM(S): at 11:11

## 2021-02-19 NOTE — LACTATION INITIAL EVALUATION - INTERVENTION OUTCOME
nbn demonstrated  deep latch and  performed  with sucking and swallowing  noted .  offered assistance as needed./verbalizes understanding/demonstrates understanding of teaching

## 2021-02-19 NOTE — DISCHARGE NOTE OB - HOSPITAL COURSE
Patient was admitted to L+D for IOL 2/2 sPEC. Patient was placed on Mg until 24hr postpartum. Patient had an uncomplicated  followed by an uncomplicated postpartum course.  EBL:  Hct:  On Postpartum day 2, patient was discharged home in stable condition, voiding spontaneously, pain well controlled, ambulating, tolerating PO and with normal vital signs. Patient's postpartum birth control plan is ___________. Pt plans to follow up in the [facility] in 6 weeks. Telephone number and clinic information provided prior to discharge.  Patient was admitted to L+D for IOL 2/2 PEC w/ severe features (headache). Patient was placed on Mg that was continued until after 24hr postpartum. Intrapartum course c/b chorioamnionitis, for which patient received ampicilin, gentamicin and tylenol. Patient had an  c/b large EBL 1500cc, requiring cytotec, hemabate, pitocin 20u, and TXA. Stat labs were drawn after delivery and Hct downtrended from 36.4 to 24.5, and patient was dizzy. She was transfused 1u PRBC, with an appropriate rise in her Hct initially to 27.3. However, Hb subsequently downtrended to 24.4 and therefore, she was transfused 1 additional unit of PRBC. Post-transfusion CBC showed Hct 28.0, which remained stable throughout the remainder of the patient's postpartum course. On PPD1, patient was also febrile to Tmax 39.3. She received Invanz, and remained afebrile and clinically did not show signs of endometritis throughout the remainder of her postpartum admission course. Blood pressures remained wnl throughout patient's postpartum course without antihypertensive medications.  EBL: 1500  Hct: 42>>>37.6->36.4->24.5->1uPRBC->27.3->24.4->1uPRBC->28->26.2  WBC: 10.3->17.3->16.2->15->12.3->12.1  Platelets: 135->127->112->109->104->96->91  On Postpartum day 2, Invanz was discontinued because patient began developing an abdominal rash concerning for possible allergic reaction and she was not exhibiting signs or symptoms of endometritis. Rash improved with Benadryl and topical Hydrocortisone cream. Patient was discharged home in stable condition, voiding spontaneously, pain well controlled, ambulating, tolerating PO and with normal vital signs. Patient's postpartum birth control plan is condoms. Patient plans to follow up in the [facility] in 1 week for a BP check, and 6 weeks for a routine postpartum visit. Telephone number and clinic information provided prior to discharge.

## 2021-02-19 NOTE — DISCHARGE NOTE OB - ADDITIONAL INSTRUCTIONS
Make your follow-up appointment with your doctor as ordered.  Make an appt in 6 weeks for a routine postpartum visit.     No heavy lifting, driving, or strenuous activity for 6 weeks. Nothing per vagina such as tampons, intercourse, douches, or tub baths for 6 weeks or until you see your doctor. Call your doctor with any signs and symptoms of infection such as fever, chills, nausea, or vomiting. Call your doctor if you're unable to tolerate food, increase in vaginal bleeding, or have difficulty urinating. Call your doctor if you have pain that is not relieved by your prescribed medications. Notify your doctor with any other concerns.   Call [phone] if you have any of these concerns in the next 6 weeks.    You have been provided with a prescription for a blood pressure cuff. Please monitor your blood pressure 3 times daily. Please schedule an appointment at [facility] in 2-7 days for a blood pressure check. Please call [phone] if you experience any blood pressures above 140/90 or if you experience severe headaches unrelieved by Tylenol, blurry vision, chest pain, shortness of breath, or right upper abdominal pain.

## 2021-02-19 NOTE — DISCHARGE NOTE OB - PLAN OF CARE
You have been provided with a prescription for a blood pressure cuff full recovery Make your follow-up appointment with your doctor as ordered.  Make an appt in 6 weeks for a routine postpartum visit.     No heavy lifting, driving, or strenuous activity for 6 weeks. Nothing per vagina such as tampons, intercourse, douches, or tub baths for 6 weeks or until you see your doctor. Call your doctor with any signs and symptoms of infection such as fever, chills, nausea, or vomiting. Call your doctor if you're unable to tolerate food, increase in vaginal bleeding, or have difficulty urinating. Call your doctor if you have pain that is not relieved by your prescribed medications. Notify your doctor with any other concerns.     Call [phone] if you have any of these concerns in the next 6 weeks. normal range blood pressures Make your follow-up appointment with your doctor as ordered.  Make an appt in 6 weeks for a routine postpartum visit.     No heavy lifting, driving, or strenuous activity for 6 weeks. Nothing per vagina such as tampons, intercourse, douches, or tub baths for 6 weeks or until you see your doctor. Call your doctor with any signs and symptoms of infection such as fever, chills, nausea, or vomiting. Call your doctor if you're unable to tolerate food, increase in vaginal bleeding, or have difficulty urinating. Call your doctor if you have pain that is not relieved by your prescribed medications. Notify your doctor with any other concerns.     Call [phone] if you have any of these concerns in the next 6 weeks. You have been provided with a prescription for a blood pressure cuff. Please monitor your blood pressure 3 times daily. Please schedule an appointment at the OB/GYN clinic in 2-7 days for a blood pressure check. Please call [phone] if you experience any blood pressures above 140/90 or if you experience severe headaches unrelieved by Tylenol, blurry vision, chest pain, shortness of breath, or right upper abdominal pain.

## 2021-02-19 NOTE — PROGRESS NOTE ADULT - PROBLEM SELECTOR PLAN 3
- Afebrile since 11:35pm last night, patient denies abdominal pain/uterine tenderness  - WBC downtrending   - Continue to monitor for clinical signs/symptoms of endometritis  - f/u BCx, UCx, and placental Cx from 2/18 - Afebrile since 11:35pm last night, patient denies abdominal pain/uterine tenderness  - WBC downtrending, most recently 12.1 this AM  - Continue to monitor for clinical signs/symptoms of endometritis  - f/u BCx, UCx, and placental Cx from 2/18

## 2021-02-19 NOTE — PROGRESS NOTE ADULT - PROBLEM SELECTOR PLAN 4
- Hct stable s/p 2u PRBC yesterday 24,4->28.0  - f/u AM CBC - Hct stable s/p 2u PRBC yesterday 24,4->28.0  - AM H/H 8.8/26.2

## 2021-02-19 NOTE — DISCHARGE NOTE OB - MEDICATION SUMMARY - MEDICATIONS TO TAKE
I will START or STAY ON the medications listed below when I get home from the hospital:    Blood Pressure Cuff  -- Monitor your blood pressure 3 times daily as directed.  -- Indication: For HTN    acetaminophen 325 mg oral tablet  -- 3 tab(s) by mouth   -- Indication: For Pain    ibuprofen 600 mg oral tablet  -- 1 tab(s) by mouth every 6 hours  -- Indication: For Pain    Prenatal Multivitamins with Folic Acid 1 mg oral tablet  -- 1 tab(s) by mouth once a day  -- Indication: For Vitamins

## 2021-02-19 NOTE — DISCHARGE NOTE OB - CARE PLAN
Principal Discharge DX:	 (normal spontaneous vaginal delivery)  Goal:	full recovery  Assessment and plan of treatment:	Make your follow-up appointment with your doctor as ordered.  Make an appt in 6 weeks for a routine postpartum visit.     No heavy lifting, driving, or strenuous activity for 6 weeks. Nothing per vagina such as tampons, intercourse, douches, or tub baths for 6 weeks or until you see your doctor. Call your doctor with any signs and symptoms of infection such as fever, chills, nausea, or vomiting. Call your doctor if you're unable to tolerate food, increase in vaginal bleeding, or have difficulty urinating. Call your doctor if you have pain that is not relieved by your prescribed medications. Notify your doctor with any other concerns.     Call [phone] if you have any of these concerns in the next 6 weeks.  Secondary Diagnosis:	Severe pre-eclampsia  Goal:	normal range blood pressures  Assessment and plan of treatment:	You have been provided with a prescription for a blood pressure cuff   Principal Discharge DX:	 (normal spontaneous vaginal delivery)  Goal:	full recovery  Assessment and plan of treatment:	Make your follow-up appointment with your doctor as ordered.  Make an appt in 6 weeks for a routine postpartum visit.     No heavy lifting, driving, or strenuous activity for 6 weeks. Nothing per vagina such as tampons, intercourse, douches, or tub baths for 6 weeks or until you see your doctor. Call your doctor with any signs and symptoms of infection such as fever, chills, nausea, or vomiting. Call your doctor if you're unable to tolerate food, increase in vaginal bleeding, or have difficulty urinating. Call your doctor if you have pain that is not relieved by your prescribed medications. Notify your doctor with any other concerns.     Call [phone] if you have any of these concerns in the next 6 weeks.  Secondary Diagnosis:	Severe pre-eclampsia  Goal:	normal range blood pressures  Assessment and plan of treatment:	You have been provided with a prescription for a blood pressure cuff. Please monitor your blood pressure 3 times daily. Please schedule an appointment at the OB/GYN clinic in 2-7 days for a blood pressure check. Please call [phone] if you experience any blood pressures above 140/90 or if you experience severe headaches unrelieved by Tylenol, blurry vision, chest pain, shortness of breath, or right upper abdominal pain.

## 2021-02-19 NOTE — DISCHARGE NOTE OB - CARE PROVIDER_API CALL
Liliana Hicks)  Obstetrics and Gynecology  [address], [address]  [place] [address]  Phone: [phone]  Fax: [phone]  Follow Up Time:

## 2021-02-19 NOTE — PROGRESS NOTE ADULT - ATTENDING COMMENTS
Associate Chief of L & D (Late entry)  I have met this patient for the first time today.  She was admitted in early labor by Dr Warren Choe  and had chorioamnionitis and developed uterine atony after delivery and with acute blood loss anemia and was diagnosned with PEC with severe features  OB Progress Note:  PPD#1  S: 32yo  PPD#1 s/p . Patient reported that she felt light headed earlier  O:  Vitals:  T(C): 38.9 (2021 13:23), Max: 39.3 (2021 13:02)  T(F): 102 (2021 13:23), Max: 102.7 (2021 13:02)  HR: 94 (2021 15:28) (90 - 132)  BP: 106/50 (2021 15:25) (91/51 - 149/91)  SpO2: 93% (2021 15:28) (83% - 100%)  MEDICATIONS  (STANDING):  acetaminophen   Tablet .. 975 milliGRAM(s) Oral <User Schedule>  diphtheria/tetanus/pertussis (acellular) Vaccine (ADAcel) 0.5 milliLiter(s) IntraMuscular once  ertapenem  IVPB 1000 milliGRAM(s) IV Intermittent every 24 hours  ibuprofen  Tablet. 600 milliGRAM(s) Oral every 6 hours  lactated ringers. 1000 milliLiter(s) (75 mL/Hr) IV Continuous <Continuous>  magnesium sulfate Infusion 1 Gm/Hr (25 mL/Hr) IV Continuous <Continuous>  misoprostol 25 MICROGram(s) Vaginal every 3 hours  ondansetron Injectable 4 milliGRAM(s) IV Push once  oxytocin Infusion 25 milliUNIT(s)/Min (75 mL/Hr) IV Continuous <Continuous>  prenatal multivitamin 1 Tablet(s) Oral daily  sodium chloride 0.9% lock flush 3 milliLiter(s) IV Push every 8 hours  Labs:             8.5<L>   14.96<H> >-----------< 104<L>    (  @ 12:47 )             24.4<L>                                        13.9   8.48 >-----------< 138<L>    ( 02-15 @ 21:18 )             42.0    21 @ 12:47      130<L>  |  98  |  6<L>  ----------------------------<  109<H>  3.6   |  22  |  0.76    21 @ 07:26      131<L>  |  100  |  5<L>  ----------------------------<  107<H>  4.1   |  22  |  0.69    21 @ 02:11      129<L>  |  101  |  4<L>  ----------------------------<  131<H>  3.8   |  19<L>  |  0.62    21 @ 15:26      131<L>  |  97<L>  |  4<L>  ----------------------------<  95  3.4<L>   |  20<L>  |  0.67    21 @ 09:02      130<L>  |  98  |  4<L>  ----------------------------<  103<H>  3.5   |  20<L>  |  0.53    02-15-21 @ 21:18    137  |  101  |  8   ----------------------------<  108<H>  3.4<L>   |  20<L>  |  0.60    Physical Exam:General: NAD  Abdomen: soft, non-tender, non-distended, fundus firm  Vaginal: Lochia wnl  Extremities: No erythema/edema  A/P: 32yo PPD#1 s/p  with PPH with acute blood loss anemia @ 1500 cc and chorioamnionitis and endometritis, PEC with severe features  - Pain well controlled, continue current pain regimen  - Increase ambulation, SCDs when not ambulating  - Continue regular diet  - Transfuse 2 units and check post tranfusion cbc  - Magnesium  for seizure prophylaxis and check mag level  - Follow up on cultures that were collected  Lucy Marinelli M.D., M.B.A., M.S.
Associate Chief of L & D (Late entry)    OB Progress Note:  PPD#2  S: 30yo  PPD#2 s/p . Patient reported that she feels better today and denies any lightheadedness.  She does have a new onset rash that she reports as itchy  O:  Vital Signs Last 24 Hrs  T(C): 36.7 (2021 10:08), Max: 39.3 (2021 13:02)  T(F): 98 (2021 10:08), Max: 102.7 (2021 13:02)  HR: 92 (2021 10:08) (83 - 126)  BP: 109/75 (2021 10:08) (91/51 - 122/76)  RR: 18 (2021 10:08) (18 - 20)  SpO2: 99% (2021 10:08) (89% - 99%)  MEDICATIONS  (STANDING):  acetaminophen   Tablet .. 975 milliGRAM(s) Oral <User Schedule>  diphtheria/tetanus/pertussis (acellular) Vaccine (ADAcel) 0.5 milliLiter(s) IntraMuscular once  ertapenem  IVPB 1000 milliGRAM(s) IV Intermittent every 24 hours  ibuprofen  Tablet. 600 milliGRAM(s) Oral every 6 hours  lactated ringers. 1000 milliLiter(s) (75 mL/Hr) IV Continuous <Continuous>  magnesium sulfate Infusion 1 Gm/Hr (25 mL/Hr) IV Continuous <Continuous>  misoprostol 25 MICROGram(s) Vaginal every 3 hours  ondansetron Injectable 4 milliGRAM(s) IV Push once  oxytocin Infusion 25 milliUNIT(s)/Min (75 mL/Hr) IV Continuous <Continuous>  prenatal multivitamin 1 Tablet(s) Oral daily  sodium chloride 0.9% lock flush 3 milliLiter(s) IV Push every 8 hours          LABS:                        8.8    12.06 )-----------( 91       ( 2021 06:53 )             26.2     2021 06:56    134    |  101    |  7      ----------------------------<  75     3.9     |  23     |  0.64     Ca    7.4        2021 06:56  Mg     3.8       2021 21:32    TPro  5.3    /  Alb  2.8    /  TBili  0.3    /  DBili  x      /  AST  29     /  ALT  16     /  AlkPhos  136    2021 06:56    PT/INR - ( 2021 06:53 )   PT: 10.6 sec;   INR: 0.93 ratio         PTT - ( 2021 06:53 )  PTT:21.0 sec    Physical Exam:  General: NAD  Abdomen: soft, non-tender 0/10, non-distended, fundus firm, erythematous area on the lowere abdomen and the is a vesicular lesion on the top left side of the umbilicus  Vaginal: Lochia wnl  Extremities: No erythema/ trace edema  A/P: 30yo PPD#2 s/p  with PPH with acute blood loss anemia @ 1500 cc s/p 2 units RBC and chorioamnionitis and improved endometritis, PEC with severe features now with new onset rash    - Pain well controlled, continue current pain regimen  - Increase ambulation, SCDs when not ambulating  - Continue regular diet  - Continue to monitor BP's  - stop IV antibiotics possible drug reaction vs contact dermatitis   - Benadryl PO  -  Caladryl lotion topically and hydrocortisone 1 % topical   - If no improvement consider dermatology consult-  this does not appear to be a cellulitis, PUPPS and it is not herpetic in nature   - Follow up on cultures that were collected  Lucy Marinelli M.D., M.B.A., M.S.

## 2021-02-19 NOTE — DISCHARGE NOTE OB - PATIENT PORTAL LINK FT
You can access the FollowMyHealth Patient Portal offered by [facility] by registering at the following website: [email or web address]. By joining [employer]’s FollowMyHealth portal, you will also be able to view your health information using other applications (apps) compatible with our system.

## 2021-02-19 NOTE — PROGRESS NOTE ADULT - PROBLEM SELECTOR PLAN 2
- BPs well controlled overnight without antihypertensives, continue to monitor VS  - Platelets downtrending, continue to monitor   - f/u AM HELLP labs  - BP cuff Rx provided - BPs well controlled overnight without antihypertensives, continue to monitor VS  - AM HELLP labs c/w HELLP syndrome despite nml LFTs  - Platelets downtrending, most recently 96->91 this AM  - BP cuff Rx provided

## 2021-02-20 VITALS
HEART RATE: 102 BPM | OXYGEN SATURATION: 98 % | DIASTOLIC BLOOD PRESSURE: 77 MMHG | TEMPERATURE: 98 F | SYSTOLIC BLOOD PRESSURE: 126 MMHG | RESPIRATION RATE: 18 BRPM

## 2021-02-20 LAB
CULTURE RESULTS: SIGNIFICANT CHANGE UP
SPECIMEN SOURCE: SIGNIFICANT CHANGE UP

## 2021-02-20 RX ADMIN — SODIUM CHLORIDE 3 MILLILITER(S): 9 INJECTION INTRAMUSCULAR; INTRAVENOUS; SUBCUTANEOUS at 06:00

## 2021-02-20 RX ADMIN — SODIUM CHLORIDE 3 MILLILITER(S): 9 INJECTION INTRAMUSCULAR; INTRAVENOUS; SUBCUTANEOUS at 15:39

## 2021-02-20 NOTE — PROGRESS NOTE ADULT - PROBLEM SELECTOR PLAN 5
- concern for allergic reaction poss due to abx use, abx discontinued  - improving with topical treatment  - continue hydrocortisone cream, benadryl PRN for pruritus    Minal Ponce MD PGY1
- Administer Benadryl PRN for symptomatic relief  - Consider topical steroid cream and derm consult if rash continues to spread and evolve despite supportive care  - f/u AM labs to evaluate for possible infectious etiology vs. allergy/skin irritant  - Differential: cellulitis vs. varicella, PUPPS unlikely as rash is not distributed along Sanjay's lines    Diane Rich MD PGY1

## 2021-02-20 NOTE — PROGRESS NOTE ADULT - SUBJECTIVE AND OBJECTIVE BOX
Anesthesia Post-op Note    POD#1 S/P     Patient is doing well.  OOBAA. Tolerating clears.  Pain is tolerable.  No residual anesthetic issues or complications noted.  
OB Progress Note:  PPD#1    S: 32yo sPEC/Mg PPD#1 s/p , intrapartum course c/b chorioamnionitis (Tmax 38.0 @1822 and lactate 3.3) s/p A/G/T. Stat labs were sent at 6:30 this morning and patient was started on Invanz for uterine manipulation at time of delivery. Postpartum course c/b high EBL 1500cc s/p hemabate, TXA, cytotec and pitocin 20u. Patient received 1u PRBC as f/u labs showed Hct downtrended 37.6 to 24.5. This morning, patient feels well. BPs well controlled without antihypertensives. Pain is well controlled. She is tolerating a regular diet. Not yet passing flatus. Hernandez in place draining concentrated yellow urine. Patient ambulating with assistance. Denies CP/SOB. Denies lightheadedness/dizziness/headache/blurry vision. Denies N/V/RUQ pain. Denies leg swelling.    O:  Vitals:  Vital Signs Last 24 Hrs  T(C): 38.3 (2021 00:06), Max: 38.3 (2021 00:06)  T(F): 100.9 (2021 00:06), Max: 100.9 (2021 00:06)  HR: 100 (2021 07:36) (90 - 132)  BP: 125/73 (2021 07:36) (91/51 - 149/91)  SpO2: 96% (2021 07:43) (83% - 100%)    I&O's Detail    2021 07:01  -  2021 07:00  --------------------------------------------------------  IN:    Lactated Ringers: 800 mL    Lactated Ringers Bolus: 2000 mL    Magnesium Sulfate: 100 mL    Magnesium Sulfate: 550 mL    Magnesium Sulfate: 250 mL    Oxytocin: 425 mL    Oxytocin: 1000 mL    Oxytocin: 125 mL  Total IN: 5250 mL    OUT:    Estimated Blood Loss (mL): 1500 mL    Indwelling Catheter - Urethral (mL): 1440 mL    Voided (mL): 1550 mL  Total OUT: 4490 mL    Total NET: 760 mL          MEDICATIONS  (STANDING):  acetaminophen   Tablet .. 975 milliGRAM(s) Oral <User Schedule>  diphtheria/tetanus/pertussis (acellular) Vaccine (ADAcel) 0.5 milliLiter(s) IntraMuscular once  ertapenem  IVPB 1000 milliGRAM(s) IV Intermittent every 24 hours  ibuprofen  Tablet. 600 milliGRAM(s) Oral every 6 hours  ketorolac   Injectable 30 milliGRAM(s) IV Push once  lactated ringers. 1000 milliLiter(s) (75 mL/Hr) IV Continuous <Continuous>  magnesium sulfate Infusion 1 Gm/Hr (25 mL/Hr) IV Continuous <Continuous>  misoprostol 25 MICROGram(s) Vaginal every 3 hours  ondansetron Injectable 4 milliGRAM(s) IV Push once  oxytocin Infusion 25 milliUNIT(s)/Min (75 mL/Hr) IV Continuous <Continuous>  prenatal multivitamin 1 Tablet(s) Oral daily  sodium chloride 0.9% lock flush 3 milliLiter(s) IV Push every 8 hours      Labs:  Blood type: O Positive  Rubella IgG: Positive ( @ 09:53)  RPR: Negative                          8.4<L>   17.31<H> >-----------< 109<L>    (  @ 02:11 )             24.5<L>                        12.3   10.33 >-----------< 109<L>    (  @ 15:26 )             36.4                        13.1   9.60 >-----------< 112<L>    (  @ 09:02 )             38.3                        12.6   9.09 >-----------< 125<L>    (  @ 03:48 )             38.9                        13.4   10.15 >-----------< 127<L>    (  @ 21:28 )             40.4                        12.9   8.50 >-----------< 135<L>    (  @ 15:50 )             38.8                        12.7   7.59 >-----------< 126<L>    (  @ 09:49 )             37.6                        13.0   8.57 >-----------< 138<L>    (  @ 05:00 )             39.8                        13.9   8.48 >-----------< 138<L>    ( 02-15 @ 21:18 )             42.0    21 @ 02:11      129<L>  |  101  |  4<L>  ----------------------------<  131<H>  3.8   |  19<L>  |  0.62    21 15:26      131<L>  |  97<L>  |  4<L>  ----------------------------<  95  3.4<L>   |  20<L>  |  0.67    21 09:02      130<L>  |  98  |  4<L>  ----------------------------<  103<H>  3.5   |  20<L>  |  0.53    21 @ 03:48      132<L>  |  98  |  4<L>  ----------------------------<  96  3.6   |  18<L>  |  0.53    21 21:28      131<L>  |  98  |  5<L>  ----------------------------<  103<H>  3.7   |  18<L>  |  0.58    21 15:50      132<L>  |  99  |  5<L>  ----------------------------<  87  3.8   |  21<L>  |  0.74    21 @ 09:49      133<L>  |  101  |  6<L>  ----------------------------<  89  3.7   |  21<L>  |  0.56    21 @ 05:00      131<L>  |  100  |  8   ----------------------------<  90  3.6   |  17<L>  |  0.53    02-15-21 @ 21:18      137  |  101  |  8   ----------------------------<  108<H>  3.4<L>   |  20<L>  |  0.60        Ca    6.6<L>      2021 02:11  Ca    7.1<L>      2021 15:26  Ca    7.3<L>      2021 09:02  Ca    7.5<L>      2021 03:48  Ca    7.6<L>      2021 21:28  Ca    7.8<L>      2021 15:50  Ca    8.0<L>      2021 09:49  Ca    9.2      2021 05:00  Ca    10.0      15 Feb 2021 21:18  Mg     3.7<H>     02-18  Mg     6.2<H>     02-17  Mg     6.2<H>     02-17  Mg     6.2<H>     02-17  Mg     6.3<H>     02-17  Mg     6.1<H>     02-16  Mg     6.0<H>     02-16  Mg     5.3<H>     02-16  Mg     4.8<H>     02-16    TPro  4.1<L>  /  Alb  2.0<L>  /  TBili  0.5  /  DBili  x   /  AST  20  /  ALT  7   /  AlkPhos  147<H>  -21 @ 02:11  TPro  6.3  /  Alb  3.4  /  TBili  0.6  /  DBili  x   /  AST  21  /  ALT  11  /  AlkPhos  217<H>  -21 @ 15:26  TPro  6.3  /  Alb  3.4  /  TBili  0.6  /  DBili  x   /  AST  21  /  ALT  13  /  AlkPhos  221<H>  21 @ 09:02  TPro  6.3  /  Alb  3.3  /  TBili  0.5  /  DBili  x   /  AST  23  /  ALT  13  /  AlkPhos  222<H>  21 @ 03:48  TPro  6.8  /  Alb  3.6  /  TBili  0.5  /  DBili  x   /  AST  25  /  ALT  15  /  AlkPhos  235<H>  21 @ 21:28  TPro  6.4  /  Alb  3.6  /  TBili  0.4  /  DBili  x   /  AST  25  /  ALT  11  /  AlkPhos  222<H>  21 @ 15:50  TPro  6.1  /  Alb  3.4  /  TBili  0.4  /  DBili  x   /  AST  24  /  ALT  14  /  AlkPhos  208<H>  21 @ 09:49  TPro  6.6  /  Alb  3.4  /  TBili  0.3  /  DBili  x   /  AST  24  /  ALT  14  /  AlkPhos  225<H>  21 @ 05:00  TPro  6.8  /  Alb  3.8  /  TBili  0.4  /  DBili  x   /  AST  31  /  ALT  18  /  AlkPhos  230<H>  02-15-21 @ 21:18    Physical Exam:  General: NAD  CV: RRR  Resp: normal work of breathing, CTABL  Abdomen: soft, non-tender, non-distended, fundus firm  Vaginal: Lochia wnl  Extremities: No erythema/edema
OB Progress Note:  PPD#3    S: 32yo PPD#3 s/p  c/b sPEC, HELLP syndrome, endometritis, and acute blood loss anemia. Patient feels well. Pain is well controlled, tolerating regular diet, passing flatus, voiding spontaneously, ambulating without difficulty. Denies fevers, chills, heavy vaginal bleeding, CP/SOB, leadheadedness/dizziness, N/V. Denies HA, blurry vision, RUQ pain. Patient having continued erythematous rash on abdomen, improving with treatment. No longer pruritic.    O:  Vitals:   Vital Signs Last 24 Hrs  T(C): 36.8 (2021 01:), Max: 37.2 (2021 21:37)  T(F): 98.2 (:), Max: 99 (2021 21:37)  HR: 104 (2021 01:) (92 - 108)  BP: 118/68 (2021 01:) (109/75 - 129/77)  BP(mean): --  RR: 17 (2021 01:01) (16 - 18)  SpO2: 98% (2021 01:) (96% - 99%)    MEDICATIONS  (STANDING):  acetaminophen   Tablet .. 975 milliGRAM(s) Oral <User Schedule>  diphtheria/tetanus/pertussis (acellular) Vaccine (ADAcel) 0.5 milliLiter(s) IntraMuscular once  ibuprofen  Tablet. 600 milliGRAM(s) Oral every 6 hours  ondansetron Injectable 4 milliGRAM(s) IV Push once  prenatal multivitamin 1 Tablet(s) Oral daily  sodium chloride 0.9% lock flush 3 milliLiter(s) IV Push every 8 hours    MEDICATIONS  (PRN):  benzocaine 20%/menthol 0.5% Spray 1 Spray(s) Topical every 6 hours PRN for Perineal discomfort  dibucaine 1% Ointment 1 Application(s) Topical every 6 hours PRN Perineal discomfort  diphenhydrAMINE 50 milliGRAM(s) Oral every 6 hours PRN Rash and/or Itching  hydrocortisone 1% Cream 1 Application(s) Topical three times a day PRN Rash and/or Itching  lanolin Ointment 1 Application(s) Topical every 6 hours PRN nipple soreness  magnesium hydroxide Suspension 30 milliLiter(s) Oral two times a day PRN Constipation  oxyCODONE    IR 5 milliGRAM(s) Oral every 3 hours PRN Moderate to Severe Pain (4-10)  oxyCODONE    IR 5 milliGRAM(s) Oral once PRN Moderate to Severe Pain (4-10)  pramoxine 1%/zinc 5% Cream 1 Application(s) Topical every 4 hours PRN Moderate Pain (4-6)  simethicone 80 milliGRAM(s) Chew every 4 hours PRN Gas  sodium chloride 0.65% Nasal 1 Spray(s) Both Nostrils every 4 hours PRN Nasal Congestion  witch hazel Pads 1 Application(s) Topical every 4 hours PRN Perineal discomfort      Labs:  Blood type: O Positive  Rubella IgG: Positive ( @ 09:53)  RPR: Negative                          8.8<L>   12.06<H> >-----------< 91<L>    (  @ 06:53 )             26.2<L>                        9.3<L>   12.30<H> >-----------< 96<L>    (  @ 23:22 )             28.0<L>                        8.5<L>   14.96<H> >-----------< 104<L>    (  @ 12:47 )             24.4<L>                        9.0<L>   16.18<H> >-----------< 104<L>    (  @ 07:26 )             27.3<L>                        8.4<L>   17.31<H> >-----------< 109<L>    (  @ 02:11 )             24.5<L>                        12.3   10.33 >-----------< 109<L>    (  @ 15:26 )             36.4                        13.1   9.60 >-----------< 112<L>    (  @ 09:02 )             38.3    21 @ 06:56      134<L>  |  101  |  7   ----------------------------<  75  3.9   |  23  |  0.64    21 @ 23:22      132<L>  |  100  |  9   ----------------------------<  82  4.0   |  23  |  0.77    21 @ 12:47      130<L>  |  98  |  6<L>  ----------------------------<  109<H>  3.6   |  22  |  0.76    21 @ 07:26      131<L>  |  100  |  5<L>  ----------------------------<  107<H>  4.1   |  22  |  0.69    21 @ 02:11      129<L>  |  101  |  4<L>  ----------------------------<  131<H>  3.8   |  19<L>  |  0.62    21 @ 15:26      131<L>  |  97<L>  |  4<L>  ----------------------------<  95  3.4<L>   |  20<L>  |  0.67    21 @ 09:02      130<L>  |  98  |  4<L>  ----------------------------<  103<H>  3.5   |  20<L>  |  0.53        Ca    7.4<L>      2021 06:56  Ca    7.1<L>      2021 23:22  Ca    6.7<L>      2021 12:47  Ca    6.8<L>      2021 07:26  Ca    6.6<L>      2021 02:11  Ca    7.1<L>      2021 15:26  Ca    7.3<L>      2021 09:02  Mg     3.8<H>       Mg     3.8<H>       Mg     4.1<H>       Mg     3.7<H>     18  Mg     6.2<H>       Mg     6.2<H>     -  Mg     6.2<H>         TPro  5.3<L>  /  Alb  2.8<L>  /  TBili  0.3  /  DBili  x   /  AST  29  /  ALT  16  /  AlkPhos  136<H>  21 @ 06:56  TPro  4.6<L>  /  Alb  2.3<L>  /  TBili  0.2  /  DBili  x   /  AST  25  /  ALT  13  /  AlkPhos  131<H>  21 @ 23:22  TPro  4.5<L>  /  Alb  2.4<L>  /  TBili  0.4  /  DBili  x   /  AST  26  /  ALT  14  /  AlkPhos  138<H>  21 @ 12:47  TPro  4.3<L>  /  Alb  2.1<L>  /  TBili  0.5  /  DBili  x   /  AST  24  /  ALT  11  /  AlkPhos  142<H>  21 @ 07:26  TPro  4.1<L>  /  Alb  2.0<L>  /  TBili  0.5  /  DBili  x   /  AST  20  /  ALT  7   /  AlkPhos  147<H>  21 @ 02:11  TPro  6.3  /  Alb  3.4  /  TBili  0.6  /  DBili  x   /  AST  21  /  ALT  11  /  AlkPhos  217<H>  21 @ 15:26  TPro  6.3  /  Alb  3.4  /  TBili  0.6  /  DBili  x   /  AST  21  /  ALT  13  /  AlkPhos  221<H>  21 @ 09:02          Physical Exam:  General: NAD  CV: RRR  Resp: CTAB  Abdomen: soft, non-tender, non-distended, fundus firm. No uterine tenderness  : Nl lochia  Extremities: No erythema. 2+ pitting edema to knees bilaterally, symmetric.     
OB Progress Note:  PPD#2    S: 30yo sPEC s/p Mg PPD#2 s/p , intrapartum course c/b chorioamnionitis s/p ampicilin/gentamicin/tylenol, postpartum course c/b endometritis, now on Invanz. Overnight, placental cultures, blood cultures and urine cultures were obtained and sent in the setting of persistent fevers. WBC stable. Patient has been afebrile since 11:15pm. Postpartum course also c/b acute blood loss anemia requiring 2u PRBC yesterday, with subsequent appropriate rise in H/H. This morning, patient feels well overall. Pain is well controlled. She is tolerating a regular diet and passing flatus. She is voiding spontaneously, and ambulating without difficulty. Denies CP/SOB. Denies headache/blurry vision/lightheadedness/dizziness. Denies N/V/RUQ pain. Denies leg swelling. However, overnight, patient noticed new onset abdominal rash. Rash originated at the midline suprapubically, but has since spread laterally along the belt line. She has also noticed rash spot on left and right periumbilical region. Finally, she has a rash spot immediately above the umbilicus that also shows a vesicular lesion. Patient states that lesions were initially pruritic, but she has not had any itching since receiving 1 dose of Benadryl. Patient has not noticed any discharge from the vesicle. She denies any pain associated with the rash. Patient notes that she had chicken pox as a child.    O:  Vitals:   Vital Signs Last 24 Hrs  T(C): 36.4 (2021 05:30), Max: 39.3 (2021 13:02)  T(F): 97.6 (2021 05:30), Max: 102.7 (2021 13:02)  HR: 93 (2021 05:30) (83 - 126)  BP: 118/68 (2021 05:30) (91/51 - 140/69)  RR: 20 (2021 01:42) (20 - 20)  SpO2: 98% (2021 01:42) (89% - 99%)    MEDICATIONS  (STANDING):  acetaminophen   Tablet .. 975 milliGRAM(s) Oral <User Schedule>  diphtheria/tetanus/pertussis (acellular) Vaccine (ADAcel) 0.5 milliLiter(s) IntraMuscular once  ertapenem  IVPB 1000 milliGRAM(s) IV Intermittent every 24 hours  ibuprofen  Tablet. 600 milliGRAM(s) Oral every 6 hours  ondansetron Injectable 4 milliGRAM(s) IV Push once  prenatal multivitamin 1 Tablet(s) Oral daily  sodium chloride 0.9% lock flush 3 milliLiter(s) IV Push every 8 hours    MEDICATIONS  (PRN):  benzocaine 20%/menthol 0.5% Spray 1 Spray(s) Topical every 6 hours PRN for Perineal discomfort  dibucaine 1% Ointment 1 Application(s) Topical every 6 hours PRN Perineal discomfort  diphenhydrAMINE 25 milliGRAM(s) Oral every 6 hours PRN Pruritus  diphenhydrAMINE 25 milliGRAM(s) Oral every 6 hours PRN Rash and/or Itching  hydrocortisone 1% Cream 1 Application(s) Topical every 6 hours PRN Moderate Pain (4-6)  lanolin Ointment 1 Application(s) Topical every 6 hours PRN nipple soreness  magnesium hydroxide Suspension 30 milliLiter(s) Oral two times a day PRN Constipation  oxyCODONE    IR 5 milliGRAM(s) Oral every 3 hours PRN Moderate to Severe Pain (4-10)  oxyCODONE    IR 5 milliGRAM(s) Oral once PRN Moderate to Severe Pain (4-10)  pramoxine 1%/zinc 5% Cream 1 Application(s) Topical every 4 hours PRN Moderate Pain (4-6)  simethicone 80 milliGRAM(s) Chew every 4 hours PRN Gas  sodium chloride 0.65% Nasal 1 Spray(s) Both Nostrils every 4 hours PRN Nasal Congestion  witch hazel Pads 1 Application(s) Topical every 4 hours PRN Perineal discomfort      Labs:  Blood type: O Positive  Rubella IgG: Positive ( @ 09:53)  RPR: Negative                          8.8<L>   12.06<H> >-----------< 91<L>    (  @ 06:53 )             26.2<L>                        9.3<L>   12.30<H> >-----------< 96<L>    (  23:22 )             28.0<L>                        8.5<L>   14.96<H> >-----------< 104<L>    (  12:47 )             24.4<L>                        9.0<L>   16.18<H> >-----------< 104<L>    (  07:26 )             27.3<L>                        8.4<L>   17.31<H> >-----------< 109<L>    (  02:11 )             24.5<L>                        12.3   10.33 >-----------< 109<L>    (  15:26 )             36.4                        13.1   9.60 >-----------< 112<L>    (  09:02 )             38.3                        12.6   9.09 >-----------< 125<L>    (  03:48 )             38.9                        13.4   10.15 >-----------< 127<L>    (  21:28 )             40.4                        12.9   8.50 >-----------< 135<L>    (  15:50 )             38.8                        12.7   7.59 >-----------< 126<L>    (  09:49 )             37.6    21 23:22      132<L>  |  100  |  9   ----------------------------<  82  4.0   |  23  |  0.77    21 12:47      130<L>  |  98  |  6<L>  ----------------------------<  109<H>  3.6   |  22  |  0.76    21 07:26      131<L>  |  100  |  5<L>  ----------------------------<  107<H>  4.1   |  22  |  0.69    21 @ 02:11      129<L>  |  101  |  4<L>  ----------------------------<  131<H>  3.8   |  19<L>  |  0.62    21 @ 15:26      131<L>  |  97<L>  |  4<L>  ----------------------------<  95  3.4<L>   |  20<L>  |  0.67    21 @ 09:02      130<L>  |  98  |  4<L>  ----------------------------<  103<H>  3.5   |  20<L>  |  0.53    21 @ 03:48      132<L>  |  98  |  4<L>  ----------------------------<  96  3.6   |  18<L>  |  0.53    21 @ 21:28      131<L>  |  98  |  5<L>  ----------------------------<  103<H>  3.7   |  18<L>  |  0.58    21 @ 15:50      132<L>  |  99  |  5<L>  ----------------------------<  87  3.8   |  21<L>  |  0.74    21 @ 09:49      133<L>  |  101  |  6<L>  ----------------------------<  89  3.7   |  21<L>  |  0.56        Ca    7.1<L>      2021 23:22  Ca    6.7<L>      2021 12:47  Ca    6.8<L>      2021 07:26  Ca    6.6<L>      2021 02:11  Ca    7.1<L>      2021 15:26  Ca    7.3<L>      2021 09:02  Ca    7.5<L>      2021 03:48  Ca    7.6<L>      2021 21:28  Ca    7.8<L>      2021 15:50  Ca    8.0<L>      2021 09:49  Mg     3.8<H>     02-18  Mg     3.8<H>     02-18  Mg     4.1<H>     02-18  Mg     3.7<H>     02-18  Mg     6.2<H>     02-17  Mg     6.2<H>     02-17  Mg     6.2<H>     02-17  Mg     6.3<H>     02-17  Mg     6.1<H>     02-16  Mg     6.0<H>     02-16  Mg     5.3<H>         TPro  4.6<L>  /  Alb  2.3<L>  /  TBili  0.2  /  DBili  x   /  AST  25  /  ALT  13  /  AlkPhos  131<H>  21 @ 23:22  TPro  4.5<L>  /  Alb  2.4<L>  /  TBili  0.4  /  DBili  x   /  AST  26  /  ALT  14  /  AlkPhos  138<H>  21 @ 12:47  TPro  4.3<L>  /  Alb  2.1<L>  /  TBili  0.5  /  DBili  x   /  AST  24  /  ALT  11  /  AlkPhos  142<H>  21 @ 07:26  TPro  4.1<L>  /  Alb  2.0<L>  /  TBili  0.5  /  DBili  x   /  AST  20  /  ALT  7   /  AlkPhos  147<H>  21 @ 02:11  TPro  6.3  /  Alb  3.4  /  TBili  0.6  /  DBili  x   /  AST  21  /  ALT  11  /  AlkPhos  217<H>  21 @ 15:26  TPro  6.3  /  Alb  3.4  /  TBili  0.6  /  DBili  x   /  AST  21  /  ALT  13  /  AlkPhos  221<H>  21 @ 09:02  TPro  6.3  /  Alb  3.3  /  TBili  0.5  /  DBili  x   /  AST  23  /  ALT  13  /  AlkPhos  222<H>  21 @ 03:48  TPro  6.8  /  Alb  3.6  /  TBili  0.5  /  DBili  x   /  AST  25  /  ALT  15  /  AlkPhos  235<H>  21 @ 21:28  TPro  6.4  /  Alb  3.6  /  TBili  0.4  /  DBili  x   /  AST  25  /  ALT  11  /  AlkPhos  222<H>  21 @ 15:50  TPro  6.1  /  Alb  3.4  /  TBili  0.4  /  DBili  x   /  AST  24  /  ALT  14  /  AlkPhos  208<H>  21 @ 09:49    Physical Exam:  General: NAD  Abdomen: soft, non-tender, non-distended, fundus firm, no rebound or guarding  Vaginal: Lochia wnl  Extremities: No erythema/edema, not warm to touch  Skin: macular rash noted along suprapubic region, 2 spots noted on either side of abdomen along umbilicus level, 1 spot noted immediately supraumbilically with 1 vesicular lesion, no crusting or discharge, lesions warm to touch, no crepitus

## 2021-02-20 NOTE — PROGRESS NOTE ADULT - PROBLEM SELECTOR PLAN 1
- Hct 36.4->24.6  - f/u post-transfusion CBC  - Pain well controlled, continue current pain regimen  - Increase ambulation  - Continue regular diet
- Pain well controlled, continue current pain regimen  - Increase ambulation, SCDs when not ambulating  - Continue regular diet  - Discharge planning
- Pain well controlled, continue current pain regimen  - Increase ambulation, SCDs when not ambulating  - Continue regular diet

## 2021-02-20 NOTE — PROGRESS NOTE ADULT - PROBLEM SELECTOR PLAN 4
- s/p 2U pRBC with symptomatic improvement and appropriate rise in H/H   - no symptoms of anemia at this time   - no heavy bleeding   - follow up AM labs

## 2021-02-20 NOTE — PROGRESS NOTE ADULT - PROBLEM SELECTOR PLAN 2
- blood pressures well controlled   - no s/s of severe features  - HELLP labs with downtrending platelets, trend 135->127->112->109->104->96->91  - f/u AM HELLP labs

## 2021-02-20 NOTE — PROGRESS NOTE ADULT - ASSESSMENT
A/P: 30yo sPEC s/p Mg PPD#2 s/p , intrapartum course c/b chorioamnionitis s/p A/G/T, postpartum course c/b endometritis now on Invanz, acute blood loss anemia requiring 2u PRBC, and new onset vesicular rash. Patient is stable and doing well post-partum.  
A/P: 32yo PPD#3 s/p  c/b sPEC, HELLP syndrome, endometritis, and acute blood loss anemia. Patient is stable and doing well post-partum. Afebrile x24hrs. No clinical signs of endometritis. Blood pressures well controlled at this time. Labs significant for downtrending platelets.   
A/P: 30yo sPEC/Mg PPD#1 s/p , intrapartum course c/b chorioamnionitis, postpartum course c/b PPH total EBL 1500. Patient is stable and doing well post-partum.

## 2021-02-20 NOTE — PROGRESS NOTE ADULT - PROBLEM SELECTOR PLAN 3
- pt remains afebrile off abx  - s/p Invanz  - no clinical signs of endometritis - pt remains afebrile off abx  - s/p Invanz  - no clinical signs of endometritis  - f/u blood cultures

## 2021-02-21 ENCOUNTER — NON-APPOINTMENT (OUTPATIENT)
Age: 31
End: 2021-02-21

## 2021-02-22 PROBLEM — Z00.00 ENCOUNTER FOR PREVENTIVE HEALTH EXAMINATION: Status: ACTIVE | Noted: 2021-02-22

## 2021-02-22 RX ORDER — IBUPROFEN 600 MG/1
600 TABLET, FILM COATED ORAL EVERY 6 HOURS
Refills: 0 | Status: ACTIVE | COMMUNITY
Start: 2021-02-22

## 2021-02-22 RX ORDER — ACETAMINOPHEN 325 MG/1
325 TABLET ORAL
Refills: 0 | Status: ACTIVE | COMMUNITY
Start: 2021-02-22

## 2021-02-23 ENCOUNTER — NON-APPOINTMENT (OUTPATIENT)
Age: 31
End: 2021-02-23

## 2021-02-23 LAB
CULTURE RESULTS: SIGNIFICANT CHANGE UP
CULTURE RESULTS: SIGNIFICANT CHANGE UP
SPECIMEN SOURCE: SIGNIFICANT CHANGE UP
SPECIMEN SOURCE: SIGNIFICANT CHANGE UP

## 2021-02-25 LAB
CULTURE RESULTS: SIGNIFICANT CHANGE UP
SPECIMEN SOURCE: SIGNIFICANT CHANGE UP

## 2021-03-01 ENCOUNTER — NON-APPOINTMENT (OUTPATIENT)
Age: 31
End: 2021-03-01

## 2021-03-08 ENCOUNTER — NON-APPOINTMENT (OUTPATIENT)
Age: 31
End: 2021-03-08

## 2021-03-15 LAB — SURGICAL PATHOLOGY STUDY: SIGNIFICANT CHANGE UP

## 2021-03-16 ENCOUNTER — NON-APPOINTMENT (OUTPATIENT)
Age: 31
End: 2021-03-16

## 2021-03-22 ENCOUNTER — NON-APPOINTMENT (OUTPATIENT)
Age: 31
End: 2021-03-22

## 2023-01-12 NOTE — LACTATION INITIAL EVALUATION - BREAST TRAUMA OR BURNS
Initiate Treatment: Tacrolimus ointment BID for face, lips, and groin \\nCetirizine 5mg PO QHS at dinner time \\nTriamcinolone 0.1% ointment BID x 5 days at a time
Modify Regimen: Mometasone ointment BID PRN flares for body
Render In Strict Bullet Format?: No
Detail Level: Zone
no
Continue Regimen: Hydroxyzine 10/5 tsp po QHS at bedtime
Discontinue Regimen: Hydrocortisone for face

## 2024-06-15 NOTE — OB PROVIDER H&P - GRAVIDA, OB PROFILE
Anesthesia Evaluation     Patient summary reviewed and Nursing notes reviewed   no history of anesthetic complications:   NPO Solid Status: > 8 hours  NPO Liquid Status: > 2 hours           Airway   Mallampati: II  TM distance: >3 FB  Neck ROM: full  No difficulty expected  Dental      Pulmonary - negative pulmonary ROS and normal exam    breath sounds clear to auscultation  Cardiovascular - negative cardio ROS and normal exam  Exercise tolerance: good (4-7 METS)    Rhythm: regular  Rate: normal        Neuro/Psych  (+) psychiatric history Depression  GI/Hepatic/Renal/Endo    (+) diabetes mellitus type 2    Musculoskeletal (-) negative ROS    Abdominal    Substance History - negative use     OB/GYN negative ob/gyn ROS         Other - negative ROS       ROS/Med Hx Other: PAT Nursing Notes unavailable.               Anesthesia Plan    ASA 3     general with block     (Patient understands anesthesia not responsible for dental damage.  Preop fem nerve block)  intravenous induction     Anesthetic plan, risks, benefits, and alternatives have been provided, discussed and informed consent has been obtained with: patient.  Pre-procedure education provided    CODE STATUS:         
1
